# Patient Record
Sex: MALE | Race: ASIAN | NOT HISPANIC OR LATINO | Employment: OTHER
[De-identification: names, ages, dates, MRNs, and addresses within clinical notes are randomized per-mention and may not be internally consistent; named-entity substitution may affect disease eponyms.]

---

## 2024-02-13 ENCOUNTER — TRANSCRIBE ORDERS (OUTPATIENT)
Dept: OTHER | Age: 69
End: 2024-02-13

## 2024-02-13 DIAGNOSIS — K86.2 PANCREAS CYST: Primary | ICD-10-CM

## 2024-02-14 ENCOUNTER — PATIENT OUTREACH (OUTPATIENT)
Dept: SURGERY | Facility: CLINIC | Age: 69
End: 2024-02-14
Payer: COMMERCIAL

## 2024-02-14 NOTE — PROGRESS NOTES
New Patient Oncology Nurse Navigator Note     Referring provider: Dr. Colorado     Referring Clinic/Organization: [facility]      Referred to: Hepatobiliary Surgery      Requested provider (if applicable): Dr. Lito Rios    Referral Received: 02/14/24       Evaluation for : Pancreas Cyst      Clinical History (per Nurse review of records provided):      See book marked documents:     Referring MD office note  Pathology report  Imaging reports   Procedure report       Clinical Assessment / Barriers to Care (Per Nurse):    None at this time.     Records Location:     [facility]     Records Needed:     ABDOMINAL IMAGING (CT SCANS, MRI, US, PET SCANS)  DATING BACK TO 2017  EUS REPORT  PATHOLOGY REPORT      Additional testing needed prior to consult:     CT CAP     Referral updates and Plan:       Consult with Surgical Oncology     02/14/2024 9:55 AM - called patient with assistance of interpretor and left a voicemail for patient to return call to schedule consult with Dr. Rios. Provided the scheduling line in the voicemail and updated instructions for new patient scheduling to continue to contact or schedule should patient return call.     Lindsay Amezcua, RN, BSN   Surgical Oncology New Patient Nurse Navigator  [facility]  [phone]

## 2024-03-08 NOTE — TELEPHONE ENCOUNTER
"RECORDS STATUS - ALL OTHER DIAGNOSIS      RECORDS RECEIVED FROM:    DATE RECEIVED:    NOTES STATUS DETAILS   OFFICE NOTE from referring provider [facility] 01/10/24: Dr. Bryce Colorado   OFFICE NOTE from medical oncologist [facility] 02/07/24: Dr. Turang E. Behbahani   OFFICE NOTE from other specialist [facility] 10/06/23: Dr. Domingo Calderon   OPERATIVE REPORT [facility] 09/25/23: nya endoscopic ultrasound   MEDICATION LIST [facility]    LABS     PATHOLOGY REPORTS Report in [facility] Cytology:  09/25/23: [id number]  \"Non-diagnostic specimen, insufficient cellularity for definitive diagnosis\"   ANYTHING RELATED TO DIAGNOSIS [facility] Most recetn 03/08/24   IMAGING (NEED IMAGES & REPORT)     CT SCANS PACS [facility]:  07/24/23: CT CAP  10/25/17: CT AP   MRI PACS [facility]:  08/15/23: MR Abd   XRAYS PACS [facility]:  06/27/23: XR Abd  06/21/23: XR Chest     "

## 2024-03-13 ENCOUNTER — PREP FOR PROCEDURE (OUTPATIENT)
Dept: ONCOLOGY | Facility: CLINIC | Age: 69
End: 2024-03-13

## 2024-03-13 ENCOUNTER — ONCOLOGY VISIT (OUTPATIENT)
Dept: SURGERY | Facility: CLINIC | Age: 69
End: 2024-03-13
Attending: SURGERY
Payer: COMMERCIAL

## 2024-03-13 ENCOUNTER — PATIENT OUTREACH (OUTPATIENT)
Dept: ONCOLOGY | Facility: CLINIC | Age: 69
End: 2024-03-13

## 2024-03-13 ENCOUNTER — PRE VISIT (OUTPATIENT)
Dept: SURGERY | Facility: CLINIC | Age: 69
End: 2024-03-13
Payer: COMMERCIAL

## 2024-03-13 VITALS
TEMPERATURE: 97.9 F | DIASTOLIC BLOOD PRESSURE: 124 MMHG | WEIGHT: 128.6 LBS | RESPIRATION RATE: 16 BRPM | HEIGHT: 65 IN | SYSTOLIC BLOOD PRESSURE: 178 MMHG | BODY MASS INDEX: 21.43 KG/M2 | OXYGEN SATURATION: 97 % | HEART RATE: 86 BPM

## 2024-03-13 DIAGNOSIS — D49.0 IPMN (INTRADUCTAL PAPILLARY MUCINOUS NEOPLASM): Primary | ICD-10-CM

## 2024-03-13 DIAGNOSIS — C61 PROSTATE CANCER METASTATIC TO INTRAABDOMINAL LYMPH NODE (H): ICD-10-CM

## 2024-03-13 DIAGNOSIS — C77.2 PROSTATE CANCER METASTATIC TO INTRAABDOMINAL LYMPH NODE (H): ICD-10-CM

## 2024-03-13 DIAGNOSIS — K86.89 PANCREATIC MASS: Primary | ICD-10-CM

## 2024-03-13 DIAGNOSIS — K86.2 PANCREAS CYST: Primary | ICD-10-CM

## 2024-03-13 PROCEDURE — 99204 OFFICE O/P NEW MOD 45 MIN: CPT | Performed by: SURGERY

## 2024-03-13 RX ORDER — ALBUTEROL SULFATE 0.83 MG/ML
2.5 SOLUTION RESPIRATORY (INHALATION)
Status: CANCELLED | OUTPATIENT
Start: 2024-03-20

## 2024-03-13 RX ORDER — DIPHENHYDRAMINE HYDROCHLORIDE 50 MG/ML
50 INJECTION INTRAMUSCULAR; INTRAVENOUS
Status: CANCELLED
Start: 2024-03-20

## 2024-03-13 RX ORDER — ALBUTEROL SULFATE 90 UG/1
1-2 AEROSOL, METERED RESPIRATORY (INHALATION)
Status: CANCELLED
Start: 2024-03-20

## 2024-03-13 RX ORDER — MEPERIDINE HYDROCHLORIDE 25 MG/ML
25 INJECTION INTRAMUSCULAR; INTRAVENOUS; SUBCUTANEOUS EVERY 30 MIN PRN
Status: CANCELLED | OUTPATIENT
Start: 2024-03-20

## 2024-03-13 RX ORDER — METHYLPREDNISOLONE SODIUM SUCCINATE 125 MG/2ML
125 INJECTION, POWDER, LYOPHILIZED, FOR SOLUTION INTRAMUSCULAR; INTRAVENOUS
Status: CANCELLED
Start: 2024-03-20

## 2024-03-13 RX ORDER — ALLOPURINOL 100 MG/1
2 TABLET ORAL EVERY MORNING
COMMUNITY
Start: 2023-05-03

## 2024-03-13 RX ORDER — EPINEPHRINE 1 MG/ML
0.3 INJECTION, SOLUTION INTRAMUSCULAR; SUBCUTANEOUS EVERY 5 MIN PRN
Status: CANCELLED | OUTPATIENT
Start: 2024-03-20

## 2024-03-13 RX ORDER — OMEPRAZOLE 20 MG/1
20 TABLET, DELAYED RELEASE ORAL
COMMUNITY

## 2024-03-13 RX ORDER — IBUPROFEN 200 MG
200 TABLET ORAL EVERY 4 HOURS PRN
COMMUNITY

## 2024-03-13 ASSESSMENT — PAIN SCALES - GENERAL: PAINLEVEL: SEVERE PAIN (7)

## 2024-03-13 NOTE — PROGRESS NOTES
Mr. Conteh is a 68-year-old Cantonese gentleman who presents with his son today.  He has a recent history of metastatic prostate cancer with significant periaortic lymphadenopathy extending throughout his abdomen.  He is also been found to have a large pancreatic cyst measuring approximately 6 cm.  It is abutting an area of massive lymphadenopathy from his prostate cancer.  He recently began treatment for the prostate cancer and his PSA has now normalized.  His most recent imaging is from August of last year.    The pancreatic cyst has been assessed with endoscopic ultrasound.  The fluid analysis was consistent with IPMN in that it had a elevated amylase, elevated CEA, and a glucose of 10.  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    The patient has left upper quadrant pain which is constant and has been persistent for many months now.    On physical examination his abdomen is scaphoid, it is soft, it is benign.  Palpation does not elicit any pain.  I cannot easily identify significant adenopathy seen on his previous scans, and I wonder if it is improved with treatment.    The patient's prostate cancer team feels that with medical management he could have a a life expectancy of 10 years or more.  With this in mind I was asked to see him for consideration of distal pancreatectomy for treatment of his pancreatic cyst.    I had a long discussion with the patient and his son, who declined an  today, regarding the findings on his scan and the implications of a mucinous cyst of the pancreas.  I discussed the risk of cancer developing and I also discussed the small chance that there is already cancer present, most  particularly because of the fact that his splenic vein is already occluded.  I discussed that I do think it is reasonable to proceed with a distal pancreatectomy and splenectomy.  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  I discussed risks of surgery including but not limited to bleeding, infection, pancreatic leak requiring abdominal drainage or additional procedures, postsplenectomy sepsis, permanent immunosuppression, diabetes, pancreatic insufficiency, and a risk of death from surgery.    We will get splenectomy vaccines ordered.  We will also repeat CT chest abdomen pelvis with pancreas protocol given that his most recent imaging was quite a while ago.  I will have a phone visit with him after that imaging just to be sure that nothing has changed in a significant way that would change our plan.    Beyond that, we will get him scheduled for a planned open distal pancreatectomy and splenectomy in the coming month or so.    30 minutes was spent face-to-face time.  15 minutes was spent in review of history, imaging, coordination of care.  5 minutes was spent in documentation.    The above was transcribed using Dragon voice recognition software that is now required for use by [facility] and [facility] in place of transcription of dictated notes.  This change may unfortunately lead into an increase in errors in the EMR. While I reviewed and edited the transcription, I may miss errors.  Please let me know of any of serious errors and I will addend the note.

## 2024-03-13 NOTE — PROGRESS NOTES
[facility]: Cancer Care Initial Note                                    Discussion with Patient:                                                      Intro to RNCC role     Education concerns: pre & post op teach        Assessment:                                                      Initial  Current living arrangement:: I live in a private home with spouse  Type of residence:: Private home - stairs  Informal Support system:: Children;Spouse  Equipment Currently Used at Home: none  Bed or wheelchair confined:: No  Mobility Status: Independent  Transportation means:: Regular car  Medication adherence problem (GOAL):: No  Knowledgeable about how to use meds:: Yes  Medication side effects suspected:: No  Advanced Care Plans/Directives on file:: No  Patient Reported Pain?: Yes, is currently well-managed    Plan of Care Education Review:   Assessment completed with:: Patient    Plan of Care Education   Yearly learning assessment completed?: Yes (see Education tab)  Diagnosis:: pancreas cyst  Tx plan/regimen:: open distal pancreatectomy and splenectomy  Does patient understand treatment plan/regimen?: Yes  Preparing for treatment:: Reviewed treatment preparation information with patient (vascular access, day of chemo, visitor policy, what to bring, etc.)  Vascular access education provided for:: Peripheral IV  Safety/self care at home reviewed with patient:: Yes  Coping - concerns/fears reviewed with patient:: Yes  Plan of Care:: Imaging;MD follow-up appointment  Reasons for deferring treatment reviewed with patient:: Yes    Evaluation of Learning  Patient Education Provided: Yes  Readiness:: Acceptance  Method:: Booklet/Handout  Response:: Demonstrates understanding           Intervention/Education provided during outreach:                                                       Discussed expectations before and after surgery   Will need new scan and visit in the upcoming weeks  Splenectomy vaccines    Patient to  follow up as scheduled at next appt  Patient to call/[employer] message with updates  Confirmed patient has clinic and triage numbers    Carolyn Thorpe, RN, BSN  Specialty Care Coordinator  [facility]  [phone]

## 2024-03-13 NOTE — LETTER
"    3/13/2024         RE: Basil Conteh  [address]  [place] [address]        Dear Colleague,    Thank you for referring your patient, Basil Conteh, to the [facility]. Please see a copy of my visit note below.    Oncology Rooming Note    March 13, 2024 9:57 AM   Basil Conteh is a 68 year old male who presents for:    Chief Complaint   Patient presents with     Oncology Clinic Visit     Initial Vitals: BP (!) 178/124   Pulse 86   Temp 97.9  F (36.6  C) (Oral)   Resp 16   Ht 1.651 m (5' 5\")   Wt 58.3 kg (128 lb 9.6 oz)   SpO2 97%   BMI 21.40 kg/m   Estimated body mass index is 21.4 kg/m  as calculated from the following:    Height as of this encounter: 1.651 m (5' 5\").    Weight as of this encounter: 58.3 kg (128 lb 9.6 oz). Body surface area is 1.64 meters squared.  Severe Pain (7) Comment: Data Unavailable   No LMP for male patient.  Allergies reviewed: Yes  Medications reviewed: Yes    Medications: Medication refills not needed today.  Pharmacy name entered into [employer]: [id number] - [place], [place] [address] AT [facility] [address]    Frailty Screening:   Is the patient here for a new oncology consult visit in cancer care? 2. No      Clinical concerns:   doctor was notified.      Sol Ballesteros, [facility]              Mr. Conteh is a 68-year-old Cantonese gentleman who presents with his son today.  He has a recent history of metastatic prostate cancer with significant periaortic lymphadenopathy extending throughout his abdomen.  He is also been found to have a large pancreatic cyst measuring approximately 6 cm.  It is abutting an area of massive lymphadenopathy from his prostate cancer.  He recently began treatment for the prostate cancer and his PSA has now normalized.  His most recent imaging is from August of last year.    The pancreatic cyst has been assessed with endoscopic ultrasound.  The fluid analysis was consistent with IPMN in that it " had a elevated amylase, elevated CEA, and a glucose of 10.  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    The patient has left upper quadrant pain which is constant and has been persistent for many months now.    On physical examination his abdomen is scaphoid, it is soft, it is benign.  Palpation does not elicit any pain.  I cannot easily identify significant adenopathy seen on his previous scans, and I wonder if it is improved with treatment.    The patient's prostate cancer team feels that with medical management he could have a a life expectancy of 10 years or more.  With this in mind I was asked to see him for consideration of distal pancreatectomy for treatment of his pancreatic cyst.    I had a long discussion with the patient and his son, who declined an  today, regarding the findings on his scan and the implications of a mucinous cyst of the pancreas.  I discussed the risk of cancer developing and I also discussed the small chance that there is already cancer present, most particularly because of the fact that his splenic vein is already occluded.  I discussed that I do think it is reasonable to proceed with a distal pancreatectomy and splenectomy.  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  I discussed risks of surgery including but not limited to bleeding, infection, pancreatic leak requiring abdominal drainage or additional procedures, postsplenectomy sepsis, permanent immunosuppression, diabetes, pancreatic insufficiency, and a risk of death from surgery.    We will get  splenectomy vaccines ordered.  We will also repeat CT chest abdomen pelvis with pancreas protocol given that his most recent imaging was quite a while ago.  I will have a phone visit with him after that imaging just to be sure that nothing has changed in a significant way that would change our plan.    Beyond that, we will get him scheduled for a planned open distal pancreatectomy and splenectomy in the coming month or so.    30 minutes was spent face-to-face time.  15 minutes was spent in review of history, imaging, coordination of care.  5 minutes was spent in documentation.    The above was transcribed using Dragon voice recognition software that is now required for use by [facility] and [facility] in place of transcription of dictated notes.  This change may unfortunately lead into an increase in errors in the EMR. While I reviewed and edited the transcription, I may miss errors.  Please let me know of any of serious errors and I will addend the note.       Again, thank you for allowing me to participate in the care of your patient.        Sincerely,        Lito Rios MD

## 2024-03-13 NOTE — PROGRESS NOTES
"Oncology Rooming Note    March 13, 2024 9:57 AM   Basil Conteh is a 68 year old male who presents for:    Chief Complaint   Patient presents with    Oncology Clinic Visit     Initial Vitals: BP (!) 178/124   Pulse 86   Temp 97.9  F (36.6  C) (Oral)   Resp 16   Ht 1.651 m (5' 5\")   Wt 58.3 kg (128 lb 9.6 oz)   SpO2 97%   BMI 21.40 kg/m   Estimated body mass index is 21.4 kg/m  as calculated from the following:    Height as of this encounter: 1.651 m (5' 5\").    Weight as of this encounter: 58.3 kg (128 lb 9.6 oz). Body surface area is 1.64 meters squared.  Severe Pain (7) Comment: Data Unavailable   No LMP for male patient.  Allergies reviewed: Yes  Medications reviewed: Yes    Medications: Medication refills not needed today.  Pharmacy name entered into ????: [id number] [place] [address] [address] [address]    Frailty Screening:   Is the patient here for a new oncology consult visit in cancer care? 2. No      Clinical concerns:   doctor was notified.      Sol Ballesteros CMA            "

## 2024-03-14 ENCOUNTER — TELEPHONE (OUTPATIENT)
Dept: SURGERY | Facility: CLINIC | Age: 69
End: 2024-03-14
Payer: COMMERCIAL

## 2024-03-14 NOTE — TELEPHONE ENCOUNTER
Called and LVM with [facility]  for patient to schedule surgery with Dr. Rios. Provided direct call back number [phone].      Adriane Yuan on 3/14/2024 at 12:07 PM

## 2024-03-18 ENCOUNTER — HOSPITAL ENCOUNTER (INPATIENT)
Facility: CLINIC | Age: 69
Setting detail: SURGERY ADMIT
End: 2024-03-18
Attending: SURGERY | Admitting: SURGERY
Payer: COMMERCIAL

## 2024-03-18 NOTE — TELEPHONE ENCOUNTER
Called patient with [employer]  and spoke with patient. Scheduled patient for surgery on 4/25 at [place].    CT scan and H&P scheduled for 4/11. Patient is aware they will get their arrival time for surgery at PAC appointment.    Pre surgery appointment with Dr. Rios scheduled for 4/17 at [facility].    Post-op scheduled for 5/8 with Dr. Rios in [facility].    Writer will mail surgery packet.    No further questions/concerns at this time.    Adriane Yuan on 3/18/2024 at 9:25 AM

## 2024-03-19 NOTE — TELEPHONE ENCOUNTER
FUTURE VISIT INFORMATION      SURGERY INFORMATION:  Date: 24  Location: uu or  Surgeon:  Lito Rios MD   Anesthesia Type:  general  Procedure: OPEN DISTAL PANCREATECTOMY, WITH SPLENECTOMY   Consult: ov 3/13/24    RECORDS REQUESTED FROM:       Primary Care Provider: Sandra Ayala MD  - [facility]    Most recent EKG+ Tracin23- [facility]

## 2024-03-20 ENCOUNTER — ALLIED HEALTH/NURSE VISIT (OUTPATIENT)
Dept: INFUSION THERAPY | Facility: CLINIC | Age: 69
End: 2024-03-20
Attending: PSYCHIATRY & NEUROLOGY
Payer: COMMERCIAL

## 2024-03-20 VITALS
SYSTOLIC BLOOD PRESSURE: 167 MMHG | TEMPERATURE: 97.5 F | HEART RATE: 84 BPM | RESPIRATION RATE: 16 BRPM | DIASTOLIC BLOOD PRESSURE: 116 MMHG

## 2024-03-20 DIAGNOSIS — D49.0 IPMN (INTRADUCTAL PAPILLARY MUCINOUS NEOPLASM): Primary | ICD-10-CM

## 2024-03-20 PROCEDURE — G0009 ADMIN PNEUMOCOCCAL VACCINE: HCPCS | Performed by: SURGERY

## 2024-03-20 PROCEDURE — 90734 MENACWYD/MENACWYCRM VACC IM: CPT | Performed by: SURGERY

## 2024-03-20 PROCEDURE — 250N000011 HC RX IP 250 OP 636: Performed by: SURGERY

## 2024-03-20 PROCEDURE — 90648 HIB PRP-T VACCINE 4 DOSE IM: CPT | Performed by: SURGERY

## 2024-03-20 PROCEDURE — 90620 MENB-4C VACCINE IM: CPT | Performed by: SURGERY

## 2024-03-20 PROCEDURE — 90670 PCV13 VACCINE IM: CPT | Performed by: SURGERY

## 2024-03-20 PROCEDURE — 250N000021 HC RX MED A9270 GY (STAT IND- M) 250: Performed by: SURGERY

## 2024-03-20 PROCEDURE — 90472 IMMUNIZATION ADMIN EACH ADD: CPT | Performed by: SURGERY

## 2024-03-20 RX ORDER — ALBUTEROL SULFATE 0.83 MG/ML
2.5 SOLUTION RESPIRATORY (INHALATION)
Status: CANCELLED | OUTPATIENT
Start: 2024-04-17

## 2024-03-20 RX ORDER — METHYLPREDNISOLONE SODIUM SUCCINATE 125 MG/2ML
125 INJECTION, POWDER, LYOPHILIZED, FOR SOLUTION INTRAMUSCULAR; INTRAVENOUS
Status: CANCELLED
Start: 2024-04-17

## 2024-03-20 RX ORDER — MEPERIDINE HYDROCHLORIDE 25 MG/ML
25 INJECTION INTRAMUSCULAR; INTRAVENOUS; SUBCUTANEOUS EVERY 30 MIN PRN
Status: CANCELLED | OUTPATIENT
Start: 2024-04-17

## 2024-03-20 RX ORDER — EPINEPHRINE 1 MG/ML
0.3 INJECTION, SOLUTION INTRAMUSCULAR; SUBCUTANEOUS EVERY 5 MIN PRN
Status: CANCELLED | OUTPATIENT
Start: 2024-04-17

## 2024-03-20 RX ORDER — ALBUTEROL SULFATE 90 UG/1
1-2 AEROSOL, METERED RESPIRATORY (INHALATION)
Status: CANCELLED
Start: 2024-04-17

## 2024-03-20 RX ORDER — DIPHENHYDRAMINE HYDROCHLORIDE 50 MG/ML
50 INJECTION INTRAMUSCULAR; INTRAVENOUS
Status: CANCELLED
Start: 2024-04-17

## 2024-03-20 RX ADMIN — MENINGOCOCCAL (GROUPS A, C, Y AND W-135) OLIGOSACCHARIDE DIPHTHERIA CRM197 CONJUGATE VACCINE 0.5 ML: KIT at 13:15

## 2024-03-20 RX ADMIN — PNEUMOCOCCAL 13-VALENT CONJUGATE VACCINE 0.5 ML: 2.2; 2.2; 2.2; 2.2; 2.2; 4.4; 2.2; 2.2; 2.2; 2.2; 2.2; 2.2; 2.2 INJECTION, SUSPENSION INTRAMUSCULAR at 12:59

## 2024-03-20 RX ADMIN — HAEMOPHILUS B POLYSACCHARIDE CONJUGATE VACCINE FOR INJ 0.5 ML: RECON SOLN at 13:14

## 2024-03-20 RX ADMIN — NEISSERIA MENINGITIDIS SEROGROUP B NHBA FUSION PROTEIN ANTIGEN, NEISSERIA MENINGITIDIS SEROGROUP B FHBP FUSION PROTEIN ANTIGEN AND NEISSERIA MENINGITIDIS SEROGROUP B NADA PROTEIN ANTIGEN 0.5 ML: 50; 50; 50; 25 INJECTION, SUSPENSION INTRAMUSCULAR at 13:13

## 2024-03-20 NOTE — PROGRESS NOTES
Infusion Nursing Note:  Basil Conteh presents today for Asplenia Vaccines.    Patient seen by provider today: No   present during visit today: Patient speaks Cantonese; Patient's son helps interpret.     Note: N/A.      Intravenous Access:  No Intravenous access/labs at this visit.    Treatment Conditions:  Not Applicable.      Post Infusion Assessment:  Patient tolerated injection without incident.       Discharge Plan:   Discharge instructions reviewed with: Patient and Family.  Patient and/or family verbalized understanding of discharge instructions and all questions answered.  Copy of AVS reviewed with patient and/or family.  Patient will return 4/17/24 for next appointment.  Patient discharged in stable condition accompanied by: son.  Departure Mode: Ambulatory.      Anmol Singh RN

## 2024-04-10 LAB
ABO/RH(D): NORMAL
ANTIBODY SCREEN: NEGATIVE
SPECIMEN EXPIRATION DATE: NORMAL

## 2024-04-11 ENCOUNTER — PRE VISIT (OUTPATIENT)
Dept: SURGERY | Facility: CLINIC | Age: 69
End: 2024-04-11

## 2024-04-11 ENCOUNTER — ANESTHESIA EVENT (OUTPATIENT)
Dept: SURGERY | Facility: CLINIC | Age: 69
End: 2024-04-11

## 2024-04-11 ENCOUNTER — LAB (OUTPATIENT)
Dept: LAB | Facility: CLINIC | Age: 69
End: 2024-04-11
Payer: COMMERCIAL

## 2024-04-11 ENCOUNTER — ANCILLARY PROCEDURE (OUTPATIENT)
Dept: CARDIOLOGY | Facility: CLINIC | Age: 69
End: 2024-04-11
Attending: NURSE PRACTITIONER
Payer: COMMERCIAL

## 2024-04-11 ENCOUNTER — ANCILLARY PROCEDURE (OUTPATIENT)
Dept: CT IMAGING | Facility: CLINIC | Age: 69
End: 2024-04-11
Attending: SURGERY
Payer: COMMERCIAL

## 2024-04-11 ENCOUNTER — TELEPHONE (OUTPATIENT)
Dept: SURGERY | Facility: CLINIC | Age: 69
End: 2024-04-11

## 2024-04-11 ENCOUNTER — OFFICE VISIT (OUTPATIENT)
Dept: SURGERY | Facility: CLINIC | Age: 69
End: 2024-04-11
Payer: COMMERCIAL

## 2024-04-11 VITALS
BODY MASS INDEX: 21.29 KG/M2 | RESPIRATION RATE: 16 BRPM | HEIGHT: 65 IN | HEART RATE: 96 BPM | TEMPERATURE: 97.5 F | SYSTOLIC BLOOD PRESSURE: 157 MMHG | DIASTOLIC BLOOD PRESSURE: 107 MMHG | WEIGHT: 127.8 LBS | OXYGEN SATURATION: 99 %

## 2024-04-11 DIAGNOSIS — C77.2 PROSTATE CANCER METASTATIC TO INTRAABDOMINAL LYMPH NODE (H): ICD-10-CM

## 2024-04-11 DIAGNOSIS — Z01.818 PREOP EXAMINATION: Primary | ICD-10-CM

## 2024-04-11 DIAGNOSIS — I10 HYPERTENSION, UNSPECIFIED TYPE: ICD-10-CM

## 2024-04-11 DIAGNOSIS — Z01.818 PREOP EXAMINATION: ICD-10-CM

## 2024-04-11 DIAGNOSIS — R94.39 ABNORMAL STRESS ECHOCARDIOGRAM: ICD-10-CM

## 2024-04-11 DIAGNOSIS — D49.0 IPMN (INTRADUCTAL PAPILLARY MUCINOUS NEOPLASM): ICD-10-CM

## 2024-04-11 DIAGNOSIS — C61 PROSTATE CANCER METASTATIC TO INTRAABDOMINAL LYMPH NODE (H): ICD-10-CM

## 2024-04-11 DIAGNOSIS — K86.2 PANCREAS CYST: ICD-10-CM

## 2024-04-11 LAB
ALBUMIN SERPL BCG-MCNC: 4.2 G/DL (ref 3.5–5.2)
ALP SERPL-CCNC: 100 U/L (ref 40–150)
ALT SERPL W P-5'-P-CCNC: <5 U/L (ref 0–70)
ANION GAP SERPL CALCULATED.3IONS-SCNC: 11 MMOL/L (ref 7–15)
AST SERPL W P-5'-P-CCNC: 16 U/L (ref 0–45)
BILIRUB SERPL-MCNC: 0.3 MG/DL
BUN SERPL-MCNC: 28.3 MG/DL (ref 8–23)
CALCIUM SERPL-MCNC: 9.4 MG/DL (ref 8.8–10.2)
CHLORIDE SERPL-SCNC: 101 MMOL/L (ref 98–107)
CREAT SERPL-MCNC: 0.99 MG/DL (ref 0.67–1.17)
DEPRECATED HCO3 PLAS-SCNC: 26 MMOL/L (ref 22–29)
EGFRCR SERPLBLD CKD-EPI 2021: 83 ML/MIN/1.73M2
ERYTHROCYTE [DISTWIDTH] IN BLOOD BY AUTOMATED COUNT: 13.2 % (ref 10–15)
GLUCOSE SERPL-MCNC: 106 MG/DL (ref 70–99)
HCT VFR BLD AUTO: 39.2 % (ref 40–53)
HGB BLD-MCNC: 13.2 G/DL (ref 13.3–17.7)
LVEF ECHO: NORMAL
MCH RBC QN AUTO: 29 PG (ref 26.5–33)
MCHC RBC AUTO-ENTMCNC: 33.7 G/DL (ref 31.5–36.5)
MCV RBC AUTO: 86 FL (ref 78–100)
PLATELET # BLD AUTO: 219 10E3/UL (ref 150–450)
POTASSIUM SERPL-SCNC: 4.7 MMOL/L (ref 3.4–5.3)
PROT SERPL-MCNC: 7.3 G/DL (ref 6.4–8.3)
RBC # BLD AUTO: 4.55 10E6/UL (ref 4.4–5.9)
SODIUM SERPL-SCNC: 138 MMOL/L (ref 135–145)
WBC # BLD AUTO: 7.1 10E3/UL (ref 4–11)

## 2024-04-11 PROCEDURE — 80053 COMPREHEN METABOLIC PANEL: CPT | Performed by: PATHOLOGY

## 2024-04-11 PROCEDURE — 74177 CT ABD & PELVIS W/CONTRAST: CPT | Performed by: RADIOLOGY

## 2024-04-11 PROCEDURE — 85027 COMPLETE CBC AUTOMATED: CPT | Performed by: PATHOLOGY

## 2024-04-11 PROCEDURE — 93306 TTE W/DOPPLER COMPLETE: CPT | Performed by: INTERNAL MEDICINE

## 2024-04-11 PROCEDURE — 36415 COLL VENOUS BLD VENIPUNCTURE: CPT | Performed by: PATHOLOGY

## 2024-04-11 PROCEDURE — 99203 OFFICE O/P NEW LOW 30 MIN: CPT | Performed by: NURSE PRACTITIONER

## 2024-04-11 PROCEDURE — 86900 BLOOD TYPING SEROLOGIC ABO: CPT | Performed by: NURSE PRACTITIONER

## 2024-04-11 RX ORDER — ACETAMINOPHEN 500 MG
500-1000 TABLET ORAL EVERY MORNING
COMMUNITY

## 2024-04-11 RX ORDER — AMLODIPINE BESYLATE 2.5 MG/1
2.5 TABLET ORAL DAILY
Qty: 1 TABLET | Refills: 1 | Status: SHIPPED | OUTPATIENT
Start: 2024-04-11 | End: 2024-04-16

## 2024-04-11 RX ORDER — IOPAMIDOL 755 MG/ML
74 INJECTION, SOLUTION INTRAVASCULAR ONCE
Status: COMPLETED | OUTPATIENT
Start: 2024-04-11 | End: 2024-04-11

## 2024-04-11 RX ORDER — DIPHENHYDRAMINE HCL 25 MG
25 TABLET ORAL EVERY MORNING
COMMUNITY

## 2024-04-11 RX ADMIN — IOPAMIDOL 74 ML: 755 INJECTION, SOLUTION INTRAVASCULAR at 12:56

## 2024-04-11 ASSESSMENT — PAIN SCALES - GENERAL: PAINLEVEL: SEVERE PAIN (7)

## 2024-04-11 NOTE — H&P
Pre-Operative H & P     CC:  Preoperative exam to assess for increased cardiopulmonary risk while undergoing surgery and anesthesia.    Date of Encounter: 4/11/2024  Primary Care Physician:  No Ref-Primary, Physician     Reason for visit:   Encounter Diagnoses   Name Primary?    Preop examination Yes    Prostate cancer metastatic to intraabdominal lymph node (H)     IPMN (intraductal papillary mucinous neoplasm)     Hypertension, unspecified type        HPI  Basil Conteh is a 68 year old male who presents for pre-operative H & P in preparation for  Procedure Information       Case: [id number] Date/Time: 04/25/24 0800    Procedure: OPEN DISTAL PANCREATECTOMY, WITH SPLENECTOMY (Abdomen)    Anesthesia type: General    Diagnosis: Pancreas cyst [K86.2]    Pre-op diagnosis: Pancreas cyst [K86.2]    [facility]: [facility]  [facility]    Providers: Lito Rios MD            Basil Conteh is a 68 year old male with hypertension, gout, GERD and metastatic prostate cancer that has an IPMN and a splenic vein occlusion.  He has been following at an outside facility for prostate cancer with significant periaortic lymphadenopathy.  He is currently managed on enzalutamide.  He as found to have a large pancreatic cyst that is abutting an area of massive lymphadenopathy from the prostate cancer.   The pancreatic cyst has been assessed with endoscopic ultrasound.  The fluid analysis was consistent with IPMN.  He has consulted with Dr. Rios and the above listed procedure has been recommended for treatment.           History is obtained from the patient, his son and chart review.    The patient's son interpreted throughout the visit - waiver signed.     Hx of abnormal bleeding or anti-platelet use: none      Past Medical History  Past Medical History:   Diagnosis Date    GERD (gastroesophageal reflux disease)     Gout     IPMN (intraductal papillary mucinous neoplasm)     Prostate cancer metastatic to multiple sites (H)        Past  Surgical History  Past Surgical History:   Procedure Laterality Date    HERNIA REPAIR         Prior to Admission Medications  Current Outpatient Medications   Medication Sig Dispense Refill    acetaminophen (TYLENOL) 500 MG tablet Take 500-1,000 mg by mouth every morning      allopurinol (ZYLOPRIM) 100 MG tablet Take 2 tablets by mouth every morning      amLODIPine (NORVASC) 2.5 MG tablet Take 1 tablet (2.5 mg) by mouth daily 1 tablet 1    diphenhydrAMINE (BENADRYL) 25 MG tablet Take 25 mg by mouth every morning      enzalutamide (XTANDI) 40 MG capsule Take 4 capsules by mouth daily (with lunch)      omeprazole (PRILOSEC OTC) 20 MG EC tablet Take 20 mg by mouth daily (with breakfast)      ibuprofen (ADVIL/MOTRIN) 200 MG tablet Take 200 mg by mouth every 4 hours as needed         Allergies  No Known Allergies    Social History  Social History     Socioeconomic History    Marital status:      Spouse name: Not on file    Number of children: 1    Years of education: Not on file    Highest education level: Not on file   Occupational History    Occupation: retired   Tobacco Use    Smoking status: Never    Smokeless tobacco: Never   Substance and Sexual Activity    Alcohol use: Never    Drug use: Never    Sexual activity: Not on file   Other Topics Concern    Not on file   Social History Narrative    Not on file     Social Determinants of Health     Financial Resource Strain: Not on file   Food Insecurity: Not on file   Transportation Needs: Not on file   Physical Activity: Not on file   Stress: Not on file   Social Connections: Not on file   Interpersonal Safety: Not on file   Housing Stability: Not on file       Family History  Family History   Problem Relation Age of Onset    Anesthesia Reaction No family hx of     Thrombosis No family hx of        Review of Systems  The complete review of systems is negative other than noted in the HPI or here.   Anesthesia Evaluation   Pt has had prior anesthetic.     No  "history of anesthetic complications       ROS/MED HX  ENT/Pulmonary:     (+)     ELEAZAR risk factors,  hypertension,                                 Neurologic:  - neg neurologic ROS     Cardiovascular:     (+)  hypertension- -   -  - -                                 Previous cardiac testing   Echo: Date: 4/11/24 Results:  Interpretation Summary  Global and regional left ventricular function is normal with an EF of 60-65%.  Right ventricular function, chamber size, wall motion, and thickness are  normal.  The inferior vena cava is normal.  No pericardial effusion is present.  No significant valvular abnormalities present.    Stress Test:  Date: 2013 Results:  CONCLUSION:   Normal bicycle stress echocardiogram with adequate heart rate and   workload.   No evidence for inducible ischemia.   LVH is present. Question of apical hypertrophic cardiomyopathy.     ECG Reviewed:  Date: 6/2023 Results:  Sinus rhythm   Minimal voltage criteria for LVH, may be normal variant   ST & T wave abnormality, consider anterolateral ischemia   Abnormal ECG     Cath:  Date: Results:      METS/Exercise Tolerance: >4 METS Comment: Walks his dogs 30 minutes daily.  Also does yard work.  Denies any exertional dyspnea or angina.    Hematologic:  - neg hematologic  ROS     Musculoskeletal: Comment: Intermittent back pain      GI/Hepatic: Comment: Splenic vein occlusion    (+) GERD, Symptomatic,                  Renal/Genitourinary: Comment: Prostate cancer      Endo: Comment: IPMN      Psychiatric/Substance Use:  - neg psychiatric ROS     Infectious Disease:  - neg infectious disease ROS     Malignancy:   (+) Malignancy, History of Prostate.Prostate CA Active status post.      Other:  - neg other ROS          BP (!) 157/107 (BP Location: Left arm, Patient Position: Sitting, Cuff Size: Adult Regular)   Pulse 96   Temp 97.5  F (36.4  C) (Oral)   Resp 16   Ht 1.651 m (5' 5\")   Wt 58 kg (127 lb 12.8 oz)   SpO2 99%   BMI 21.27 kg/m  "     Physical Exam   Constitutional: Awake, alert, cooperative, no apparent distress, and appears stated age.  Eyes: Pupils equal, round and reactive to light, extra ocular muscles intact, sclera clear, conjunctiva normal.  HENT: Normocephalic, oral pharynx with moist mucus membranes, good dentition. No goiter appreciated.   Respiratory: Clear to auscultation bilaterally, no crackles or wheezing.  Cardiovascular: Regular rate and rhythm, normal S1 and S2, and no murmur noted.  Carotids +2, no bruits. No edema. Palpable pulses to radial  DP and PT arteries.   GI: Normal bowel sounds, soft, non-distended, non-tender, no masses palpated, no hepatosplenomegaly.    Lymph/Hematologic: No cervical lymphadenopathy and no supraclavicular lymphadenopathy.  Genitourinary:  deferred  Skin: Warm and dry.  No rashes at anticipated surgical site.   Musculoskeletal: Full ROM of neck. There is no redness, warmth, or swelling of the exposed joints. Gross motor strength is normal.    Neurologic: Awake, alert, oriented to name, place and time. Cranial nerves II-XII are grossly intact. Gait is normal.   Neuropsychiatric: Calm, cooperative. Normal affect.     Prior Labs/Diagnostic Studies   All labs and imaging personally reviewed     EKG/ stress test - if available please see in ROS above           The patient's records and results personally reviewed by this provider.     Outside records reviewed from: [facility]    LAB/DIAGNOSTIC STUDIES TODAY:    Component      Latest Ref Rng 4/11/2024  2:18 PM   Sodium      135 - 145 mmol/L 138    Potassium      3.4 - 5.3 mmol/L 4.7    Carbon Dioxide (CO2)      22 - 29 mmol/L 26    Anion Gap      7 - 15 mmol/L 11    Urea Nitrogen      8.0 - 23.0 mg/dL 28.3 (H)    Creatinine      0.67 - 1.17 mg/dL 0.99    GFR Estimate      >60 mL/min/1.73m2 83    Calcium      8.8 - 10.2 mg/dL 9.4    Chloride      98 - 107 mmol/L 101    Glucose      70 - 99 mg/dL 106 (H)    Alkaline Phosphatase      40 - 150 U/L  100    AST      0 - 45 U/L 16    ALT      0 - 70 U/L <5    Protein Total      6.4 - 8.3 g/dL 7.3    Albumin      3.5 - 5.2 g/dL 4.2    Bilirubin Total      <=1.2 mg/dL 0.3    WBC      4.0 - 11.0 10e3/uL 7.1    RBC Count      4.40 - 5.90 10e6/uL 4.55    Hemoglobin      13.3 - 17.7 g/dL 13.2 (L)    Hematocrit      40.0 - 53.0 % 39.2 (L)    MCV      78 - 100 fL 86    MCH      26.5 - 33.0 pg 29.0    MCHC      31.5 - 36.5 g/dL 33.7    RDW      10.0 - 15.0 % 13.2    Platelet Count      150 - 450 10e3/uL 219       Legend:  (H) High  (L) Low    Assessment    Basil Conteh is a 68 year old male seen as a PAC referral for risk assessment and optimization for anesthesia.    Plan/Recommendations  Pt will be optimized for the proposed procedure.  See below for details on the assessment, risk, and preoperative recommendations    NEUROLOGY  - No history of TIA, CVA or seizure    -Post Op delirium risk factors:  No risk identified    ENT  - No current airway concerns.  Will need to be reassessed day of surgery.  Mallampati: I  TM: > 3    CARDIAC  - No history of CAD and Afib  - prior to today's PAC visit, he hasn't been diagnosed with hypertension, but blood pressure x 2 elevated in clinic today with DBP >100.  Looking back in oncology notes from [facility] Everywhere he has had similar BPs dating back to February.  He reports that overall his blood pressures have been elevated for at least a year.  He doesn't check his blood pressures at home.  Patient doesn't think he will be able to get into his primary care office within the next week, but is willing to make an appt as soon as they have availability.  Will start amlodipine 2.5mg daily with a 30 day supply (1 refill).  Patient encouraged to purchase a blood pressure monitor for home and check blood pressures daily.  Will ask [facility] to call patient late next week to check up on home readings.  Patient to follow up with primary care provider as soon as possible for continued  "hypertension management.  Patient and son verbalized their understanding and are agreeable with the plan.    - review of stress echo from 2013 at outside facility which said question apical hypertrophic cardiomyopathy.  No follow up found.  Patient asymptomatic, but will order transthoracic echo for re-evaluation prior to surgery.     **addendum (4/12/24)- echo completed yesterday afternoon.  No noted concern for apical hypertrophic cardiomyopathy.  No other concerning findings.  See report in ROS.**    - METS (Metabolic Equivalents)  Patient performs 4 or more METS exercise without symptoms             Total Score: 0      RCRI-Low risk: Class 2 0.9% complication rate             Total Score: 1    RCRI: High Risk Surgery        PULMONARY    ELEAZAR Medium Risk             Total Score: 3    ELEAZAR: Hypertension    ELEAZAR: Over 50 ys old    ELEAZAR: Male      - Denies asthma or inhaler use  - Tobacco History    History   Smoking Status    Never   Smokeless Tobacco    Never       GI  - GERD isn't well controlled despite omeprazole.  Will order Bicitra for DOS.  PONV Medium Risk  Total Score: 2           1 AN PONV: Patient is not a current smoker    1 AN PONV: Intended Post Op Opioids          - on enzalutamide for prostate cancer.  Denies any LUTS.    ENDOCRINE    - BMI: Estimated body mass index is 21.27 kg/m  as calculated from the following:    Height as of this encounter: 1.651 m (5' 5\").    Weight as of this encounter: 58 kg (127 lb 12.8 oz).  Healthy Weight (BMI 18.5-24.9)  - No history of Diabetes Mellitus    - IPMN.  Surgery planned as above.      HEME  VTE High Risk 3%             Total Score: 8    VTE: Greater than 59 yrs old    VTE: Male    VTE: Current cancer      - No history of abnormal bleeding or antiplatelet use.          Different anesthesia methods/types have been discussed with the patient, but they are aware that the final plan will be decided by the assigned anesthesia provider on the date of " service.      The patient is optimized for their procedure. AVS with information on surgery time/arrival time, meds and NPO status given by nursing staff. No further diagnostic testing indicated.      On the day of service:     Prep time: 13 minutes  Visit time: 20 minutes  Documentation time: 8 minutes  ------------------------------------------  Total time: 41 minutes      TAMARA Navas CNP  Preoperative Assessment Center  [facility]  Clinic and Surgery Center  Phone: [phone]  Fax: [phone]

## 2024-04-11 NOTE — DISCHARGE INSTRUCTIONS

## 2024-04-11 NOTE — PATIENT INSTRUCTIONS
Preparing for Your Surgery      Name:  Basil Conteh   MRN:  [id number]   :  1955   Today's Date:  2024       Arriving for surgery:  Surgery date:  2024  Arrival time:  6:00 am    Please come to:     Please come to:       [facility]    [address] [place]  [address]     The [facility] [place] Patient/Visitor Ramp is at [address]. Patients and visitors who self-park will receive the reduced hospital parking rate. If the Patient /Visitor Ramp is full, please follow the signs to the [email or web address] car park located at the main hospital entrance.       parking is available (24 hours/ 7 days a week)      Discounted parking pass options are available for patients and visitors. They can be purchased at the [employer] desk at the main hospital entrance.     -    Stop at the security desk and they will direct surgery patients to the [facility] [phone]        - If you need directions, a wheelchair or an escort please stop at the Information/security desk in the lobby.     What can I eat or drink?  -  You may eat and drink normally up to 8 hours prior to arrival time. (Until Midnight)  -  You may have clear liquids until 2 hours prior to arrival time. (Until 4 am)    Examples of clear liquids:  Water  Clear broth  Juices (apple, white grape, white cranberry  and cider) without pulp  Noncarbonated, powder based beverages  (lemonade and Zafar-Aid)  Sodas (Sprite, 7-Up, ginger ale and seltzer)  Coffee or tea (without milk or cream)  Gatorade    -  No Alcohol or cannabis products for at least 24 hours before surgery.     Which medicines can I take?    Hold Aspirin for 7 days before surgery.   Hold Multivitamins for 7 days before surgery.  Hold Supplements for 7 days before surgery.  Hold Ibuprofen (Advil, Motrin) for 1 day(s) before surgery--unless otherwise  directed by surgeon.  Hold Naproxen (Aleve) for 4 days before surgery.    -  DO NOT take these medications the day of surgery:  Diphenhydramine (Benadryl)          -  PLEASE TAKE these medications the day of surgery:  Allopurinal   Amlodipine   Omeprazole       How do I prepare myself?  - Please take 2 showers (one the night prior to surgery and one the morning of surgery) using Scrubcare or Hibiclens soap.    Use this soap only from the neck to your toes.     Leave the soap on your skin for one minute--then rinse thoroughly.      You may use your own shampoo and conditioner. No other hair products.   - Please remove all jewelry and body piercings.  - No lotions, deodorants or fragrance.  - No makeup or fingernail polish.   - Bring your ID and insurance card.      -Begin Amlodipine as prescribed  -Check your Blood pressure daily  - The Nurse coordinator will call you next week to get your daily blood pressure results  -Make an appointment with your primary MD about treatment for high blood pressure     -If you use a CPAP machine, please bring the CPAP machine, tubing, and mask to hospital.    -If you have a Deep Brain Stimulator, Spinal Cord Stimulator, or any Neuro Stimulator device---you must bring the remote control to the hospital.      ALL PATIENTS GOING HOME THE SAME DAY OF SURGERY ARE REQUIRED TO HAVE A RESPONSIBLE ADULT TO DRIVE AND BE IN ATTENDANCE WITH THEM FOR 24 HOURS FOLLOWING SURGERY.    Covid testing policy as of 12/06/2022  Your surgeon will notify and schedule you for a COVID test if one is needed before surgery--please direct any questions or COVID symptoms to your surgeon      Questions or Concerns:    - For any questions regarding the day of surgery or your hospital stay, please contact the Pre Admission Nursing Office at [phone].       - If you have health changes between today and your surgery, please call your surgeon.       - For questions after surgery, please call your surgeons office.            Current Visitor Guidelines    You may have 2 visitors in the pre op area.    Visiting hours: 8 a.m. to 8:30 p.m.    Patients confirmed or suspected to have symptoms of COVID 19 or flu:     No visitors allowed for adult patients.   Children (under age 18) can have 1 named visitor.     People who are sick or showing symptoms of COVID 19 or flu:    Are not allowed to visit patients--we can only make exceptions in special situations.       Please follow these guidelines for your visit:          Please maintain social distance          Masking is optional--however at times you may be asked to wear a mask for the safety of yourself and others     Clean your hands with alcohol hand . Do this when you arrive at and leave the building and patient room,    And again after you touch your mask or anything in the room.     Go directly to and from the room you are visiting.     Stay in the patient s room during your visit. Limit going to other places in the hospital as much as possible     Leave bags and jackets at home or in the car.     For everyone s health, please don t come and go during your visit. That includes for smoking   during your visit.

## 2024-04-11 NOTE — TELEPHONE ENCOUNTER
Using a Cantonese  by phone, mike Castaneda that Ritika Amezcua NP, would like Basil to have an echo prior to surgery.  So [place] [facility]  She expressed understanding.  Liana Burton RN

## 2024-04-12 ENCOUNTER — TELEPHONE (OUTPATIENT)
Dept: SURGERY | Facility: CLINIC | Age: 69
End: 2024-04-12
Payer: COMMERCIAL

## 2024-04-12 NOTE — TELEPHONE ENCOUNTER
Using a Cantonese  by phone, left a voice message for the patient updating him that - his labs and echo are good for surgery. Liana Burton RN

## 2024-04-12 NOTE — RESULT ENCOUNTER NOTE
Quinten Shepherd    Can you please call the patient today and let him know that his labs and echo are good for surgery.    Thank you!  Ritika Amezcua DNP, RN, ANP-C

## 2024-04-13 RX ORDER — ENOXAPARIN SODIUM 100 MG/ML
40 INJECTION SUBCUTANEOUS
Status: CANCELLED | OUTPATIENT
Start: 2024-04-13

## 2024-04-13 RX ORDER — LEVOFLOXACIN 5 MG/ML
500 INJECTION, SOLUTION INTRAVENOUS ONCE
Status: CANCELLED | OUTPATIENT
Start: 2024-04-13 | End: 2024-04-13

## 2024-04-13 RX ORDER — METRONIDAZOLE 500 MG/100ML
500 INJECTION, SOLUTION INTRAVENOUS ONCE
Status: CANCELLED | OUTPATIENT
Start: 2024-04-13 | End: 2024-04-13

## 2024-04-16 RX ORDER — AMLODIPINE BESYLATE 2.5 MG/1
2.5 TABLET ORAL DAILY
Qty: 30 TABLET | Refills: 1 | Status: SHIPPED | OUTPATIENT
Start: 2024-04-16 | End: 2024-04-21

## 2024-04-17 ENCOUNTER — TELEPHONE (OUTPATIENT)
Dept: GASTROENTEROLOGY | Facility: CLINIC | Age: 69
End: 2024-04-17

## 2024-04-17 ENCOUNTER — ONCOLOGY VISIT (OUTPATIENT)
Dept: SURGERY | Facility: CLINIC | Age: 69
End: 2024-04-17
Attending: SURGERY
Payer: COMMERCIAL

## 2024-04-17 VITALS
SYSTOLIC BLOOD PRESSURE: 151 MMHG | BODY MASS INDEX: 21.66 KG/M2 | WEIGHT: 130 LBS | HEIGHT: 65 IN | HEART RATE: 96 BPM | DIASTOLIC BLOOD PRESSURE: 102 MMHG | OXYGEN SATURATION: 98 % | RESPIRATION RATE: 16 BRPM

## 2024-04-17 DIAGNOSIS — K86.89 PANCREATIC MASS: ICD-10-CM

## 2024-04-17 DIAGNOSIS — D49.0 IPMN (INTRADUCTAL PAPILLARY MUCINOUS NEOPLASM): Primary | ICD-10-CM

## 2024-04-17 DIAGNOSIS — K86.89 PANCREATIC MASS: Primary | ICD-10-CM

## 2024-04-17 PROCEDURE — 36415 COLL VENOUS BLD VENIPUNCTURE: CPT | Performed by: SURGERY

## 2024-04-17 PROCEDURE — 86301 IMMUNOASSAY TUMOR CA 19-9: CPT | Mod: GA | Performed by: SURGERY

## 2024-04-17 PROCEDURE — 90471 IMMUNIZATION ADMIN: CPT | Performed by: SURGERY

## 2024-04-17 PROCEDURE — 250N000021 HC RX MED A9270 GY (STAT IND- M) 250: Performed by: SURGERY

## 2024-04-17 PROCEDURE — 99215 OFFICE O/P EST HI 40 MIN: CPT | Performed by: SURGERY

## 2024-04-17 PROCEDURE — 90620 MENB-4C VACCINE IM: CPT | Performed by: SURGERY

## 2024-04-17 RX ORDER — METHYLPREDNISOLONE SODIUM SUCCINATE 125 MG/2ML
125 INJECTION, POWDER, LYOPHILIZED, FOR SOLUTION INTRAMUSCULAR; INTRAVENOUS
Start: 2024-05-15

## 2024-04-17 RX ORDER — EPINEPHRINE 1 MG/ML
0.3 INJECTION, SOLUTION INTRAMUSCULAR; SUBCUTANEOUS EVERY 5 MIN PRN
OUTPATIENT
Start: 2024-05-15

## 2024-04-17 RX ORDER — ALBUTEROL SULFATE 0.83 MG/ML
2.5 SOLUTION RESPIRATORY (INHALATION)
OUTPATIENT
Start: 2024-05-15

## 2024-04-17 RX ORDER — ALBUTEROL SULFATE 90 UG/1
1-2 AEROSOL, METERED RESPIRATORY (INHALATION)
Start: 2024-05-15

## 2024-04-17 RX ORDER — MEPERIDINE HYDROCHLORIDE 25 MG/ML
25 INJECTION INTRAMUSCULAR; INTRAVENOUS; SUBCUTANEOUS EVERY 30 MIN PRN
OUTPATIENT
Start: 2024-05-15

## 2024-04-17 RX ORDER — DIPHENHYDRAMINE HYDROCHLORIDE 50 MG/ML
50 INJECTION INTRAMUSCULAR; INTRAVENOUS
Start: 2024-05-15

## 2024-04-17 RX ADMIN — NEISSERIA MENINGITIDIS SEROGROUP B NHBA FUSION PROTEIN ANTIGEN, NEISSERIA MENINGITIDIS SEROGROUP B FHBP FUSION PROTEIN ANTIGEN AND NEISSERIA MENINGITIDIS SEROGROUP B NADA PROTEIN ANTIGEN 0.5 ML: 50; 50; 50; 25 INJECTION, SUSPENSION INTRAMUSCULAR at 15:35

## 2024-04-17 ASSESSMENT — PAIN SCALES - GENERAL: PAINLEVEL: EXTREME PAIN (8)

## 2024-04-17 NOTE — TELEPHONE ENCOUNTER
Advanced Endoscopy     Referring provider:  Lito Rios MD     Referred to: [facility] Provider [facility]     Provider Requested:  none specified     Referral Received: 4/17/24     Records received:  [facility]    CT abd/pelvis with contrast 4/11/24  IMPRESSION: History of metastatic prostate cancer and pancreatic cyst  with prior EUS findings suggestive of mucinous cystic neoplasm, most  likely IPMN.      1. Interval enlargement of cystic lesion along the pancreatic distal  body/tail, measuring up to 7.9 cm, previously up to 5.3 cm. Posterior  margin of the cyst is inseparable from the bulky retroperitoneal  adenopathy. Cyst demonstrates high-risk features with increased  ill-defined nodular enhancing areas along the superior and  posteromedial aspects.   2. Persistent splenic vein occlusion with dilated perisplenic and  perigastric venous collaterals.  3. Findings of peritoneal carcinomatosis with new irregular  peripherally enhancing lesion in the rectovesical space, new 4.6 cm  soft tissue thickening in the perihepatic space and enhancing soft  tissue nodule in the omentum.   4. Decreased retroperitoneal and iliac chain lymphadenopathy.  5. Mild concentric gallbladder wall thickening/edema is favored  Reactive    MR abd with contrast 8/15/23  IMPRESSION:   1a. Thick-walled cystic structure along the pancreatic tail with a partial septation along the internal margin. While a pseudocyst remains within the differential, the capsular irregularity raises suspicion for cystic neoplasm. Recommend GI consultation for further evaluation.   1b. Additional subcentimeter cystic lesion within the pancreatic body. This lesion likely represents a sidebranch IPMN. Attention on follow-up.   2. Numerous T2 hyperintense hepatic cysts. No suspicious enhancing hepatic lesions.   3. Extensive retroperitoneal adenopathy encompassing the abdominal aorta      Images received: PACs    Insurance Coverage: [facility]    Evaluation for:   pancreatic mass, rapidly growing pancreas mass very concerning for pancreas cancer.      Clinical History (per RN review):     Referring providers office visit 3/13/24  Mr. Conteh is a 68-year-old Cantonese gentleman who presents with his son today.  He has a recent history of metastatic prostate cancer with significant periaortic lymphadenopathy extending throughout his abdomen.  He is also been found to have a large pancreatic cyst measuring approximately 6 cm.  It is abutting an area of massive lymphadenopathy from his prostate cancer.  He recently began treatment for the prostate cancer and his PSA has now normalized.  His most recent imaging is from August of last year.     The pancreatic cyst has been assessed with endoscopic ultrasound.  The fluid analysis was consistent with IPMN in that it had a elevated amylase, elevated CEA, and a glucose of 10.  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     The patient has left upper quadrant pain which is constant and has been persistent for many months now.    On physical examination his abdomen is scaphoid, it is soft, it is benign.  Palpation does not elicit any pain.  I cannot easily identify significant adenopathy seen on his previous scans, and I wonder if it is improved with treatment.     The patient's prostate cancer team feels that with medical management he could have a a life expectancy of 10 years or more.  With this in mind I was asked to see him for consideration of distal pancreatectomy for treatment of his pancreatic cyst.    MD review date:   MD Decision for clinic consultation/Orders:            Referral updates/Patient contacted:

## 2024-04-17 NOTE — PROGRESS NOTES
Mr. Jose presents with his wife today for discussion regarding repeat imaging prior to surgery planned for next week.    New CT scan reveals increasing soft tissue surrounding critical blood vessels in the celiac trunk.  This appears confluent with the area of the pancreatic cyst that was previously seen but significantly more soft tissue density which is concerning for pancreatic malignancy.  In addition the CT comments on possible peritoneal studding.    I discussed with the patient and his wife that there has been significant change in his scans and that I am concerned that this represents pancreatic cancer rather than a benign cyst.  I discussed that we would have to cancel his upcoming surgery and plan for an urgent biopsy as quickly as possible so that we can have a definitive diagnosis.  Based on the extent of his disease this will not be surgically resectable but the biopsy will be important for alternative treatment planning.  In addition we will add a CA 19-9 to his labs today.    The visit was performed with an  today.  The patient and his wife seem to understand the situation although clearly disappointed by it.  We have put in an urgent referral to GI for an endoscopic ultrasound for biopsy of his mass.  In addition we we will communicate with his oncologist office so that they are aware of the situation.    Follow-up after biopsies to discuss results.  20 minutes was spent in face-to-face time.  20 minutes was spent in review of history, imaging, coordination of care.  5 minutes were spent documentation.

## 2024-04-17 NOTE — LETTER
"    4/17/2024         RE: Basil Conteh  [address]  [place] [address]        Dear Colleague,    Thank you for referring your patient, Basil Conteh, to the [facility]. Please see a copy of my visit note below.    Oncology Rooming Note    April 17, 2024 2:46 PM   Basil Conteh is a 68 year old male who presents for:    Chief Complaint   Patient presents with     Oncology Clinic Visit     Initial Vitals: Resp 16   Ht 1.651 m (5' 5\")   Wt 59 kg (130 lb)   BMI 21.63 kg/m   Estimated body mass index is 21.63 kg/m  as calculated from the following:    Height as of this encounter: 1.651 m (5' 5\").    Weight as of this encounter: 59 kg (130 lb). Body surface area is 1.64 meters squared.  Extreme Pain (8) Comment: Data Unavailable   No LMP for male patient.  Allergies reviewed: Yes  Medications reviewed: Yes    Medications: Medication refills not needed today.  Pharmacy name entered into logtrust: [facility] #[id number] - [place], [place] - [address] AT [facility] & [facility] [address]    Frailty Screening:   Is the patient here for a new oncology consult visit in cancer care? 2. No        Ailin Hill MA              Mr. Jose presents with his wife today for discussion regarding repeat imaging prior to surgery planned for next week.    New CT scan reveals increasing soft tissue surrounding critical blood vessels in the celiac trunk.  This appears confluent with the area of the pancreatic cyst that was previously seen but significantly more soft tissue density which is concerning for pancreatic malignancy.  In addition the CT comments on possible peritoneal studding.    I discussed with the patient and his wife that there has been significant change in his scans and that I am concerned that this represents pancreatic cancer rather than a benign cyst.  I discussed that we would have to cancel his upcoming surgery and plan for an urgent biopsy as quickly as possible so that we " can have a definitive diagnosis.  Based on the extent of his disease this will not be surgically resectable but the biopsy will be important for alternative treatment planning.  In addition we will add a CA 19-9 to his labs today.    The visit was performed with an  today.  The patient and his wife seem to understand the situation although clearly disappointed by it.  We have put in an urgent referral to GI for an endoscopic ultrasound for biopsy of his mass.  In addition we we will communicate with his oncologist office so that they are aware of the situation.    Follow-up after biopsies to discuss results.  20 minutes was spent in face-to-face time.  20 minutes was spent in review of history, imaging, coordination of care.  5 minutes were spent documentation.        Again, thank you for allowing me to participate in the care of your patient.        Sincerely,        Lito Rios MD

## 2024-04-17 NOTE — PROGRESS NOTES
"Oncology Rooming Note    April 17, 2024 2:46 PM   Basil Conteh is a 68 year old male who presents for:    Chief Complaint   Patient presents with    Oncology Clinic Visit     Initial Vitals: Resp 16   Ht 1.651 m (5' 5\")   Wt 59 kg (130 lb)   BMI 21.63 kg/m   Estimated body mass index is 21.63 kg/m  as calculated from the following:    Height as of this encounter: 1.651 m (5' 5\").    Weight as of this encounter: 59 kg (130 lb). Body surface area is 1.64 meters squared.  Extreme Pain (8) Comment: Data Unavailable   No LMP for male patient.  Allergies reviewed: Yes  Medications reviewed: Yes    Medications: Medication refills not needed today.  Pharmacy name entered into [employer]: [facility] #[id number] - [place] [address] [facility] [address]    Frailty Screening:   Is the patient here for a new oncology consult visit in cancer care? 2. No        Ailin Hill MA            "

## 2024-04-18 ENCOUNTER — PATIENT OUTREACH (OUTPATIENT)
Dept: ONCOLOGY | Facility: CLINIC | Age: 69
End: 2024-04-18
Payer: COMMERCIAL

## 2024-04-18 NOTE — PROGRESS NOTES
Called to update care team about scan/terminating pancreatectomy   Left detailed message with Karolyn with direct number for CB   Planning EUS with Dr. Plata's team;   Basil will need pain referral.       Carolyn Thorpe, RN, BSN  Specialty Care Coordinator  [facility]  [phone]

## 2024-04-19 ENCOUNTER — PATIENT OUTREACH (OUTPATIENT)
Dept: GASTROENTEROLOGY | Facility: CLINIC | Age: 69
End: 2024-04-19
Payer: COMMERCIAL

## 2024-04-19 ENCOUNTER — PREP FOR PROCEDURE (OUTPATIENT)
Dept: GASTROENTEROLOGY | Facility: CLINIC | Age: 69
End: 2024-04-19
Payer: COMMERCIAL

## 2024-04-19 DIAGNOSIS — K86.89 PANCREATIC MASS: Primary | ICD-10-CM

## 2024-04-19 LAB — CANCER AG19-9 SERPL IA-ACNC: 1198 U/ML

## 2024-04-19 NOTE — TELEPHONE ENCOUNTER
Called pt with use of . No answer on home line. Left VM on mobile number. No other contact information.     Procedure/Imaging/Clinic: Upper EUS   Physician: David   Timing: Within 7-10 days - Perhaps OR 4/23?   Scope time needed:Standard for location   Anesthesia:MAC   Dx: Pancreatic cystic mass   Tier:Tier 2 - Not life/limb threatening but potential for  patient morbidity/mortality or adverse  patient/disease outcome if  surgery/procedure not done within 30 day   Location: [facility] vs [facility] based on availability   Header of letter for pt communication:    Repeat endoscopic ultrasound examination with biopsy of pancreatic lesion.     1400    Pt's wife returned phone call, would like to schedule on Weds 4/24.    Explained the OR will call 1-2 days prior to procedure date with arrival time, will need a , someone to stay with them for 24 hours and should stay in town for 24 hours (within 45 min of Hospital) post procedure    Patient needs to get pre-op physical completed. If outside  health system will need physical faxed to number [phone]     If you do not get a preop physical, your procedure could be cancelled, patient voiced understanding*    Preop Plan: PAC visit completed on 4/11 in prep for surgery, this visit can be utilized for this procedure purposes.    Does patient have any history of gastric bypass/gastric surgery/altered panc/bili anatomy? none    Any recent Covid symptoms or positive covid test? No current symptoms, discussed if symptomatic to test at home    Does patient have [employer] insurance?:BCBS    Med Review    Blood thinner - none , advised he hold advil for 24 hours prior to procedure  ASA - none   Diabetic -    Any meds by injection or mouth for weight loss or diabetes- none    Patient Education r/t procedure: letter in mail, [email or web address]    A pre-op nurse will call 1-2 days prior to the procedure.    NPO/Prep:   Adults and Children of all ages may consume solids up to 8  hours prior to arrival time - may consume clear liquids up to 1 hour prior to arrival time.    Verbalized understanding of all instructions. All questions answered.     Procedure order placed, message routed to OR        Lucina Jacobs RN, BSN,   [facility]  Care coordinator

## 2024-04-21 ENCOUNTER — TELEPHONE (OUTPATIENT)
Dept: SURGERY | Facility: CLINIC | Age: 69
End: 2024-04-21
Payer: COMMERCIAL

## 2024-04-21 RX ORDER — AMLODIPINE BESYLATE 5 MG/1
5 TABLET ORAL DAILY
Qty: 30 TABLET | Refills: 1 | Status: SHIPPED | OUTPATIENT
Start: 2024-04-21

## 2024-04-21 NOTE — TELEPHONE ENCOUNTER
RE: echo and BP follow up  Received: 2 days ago  Ritika Amezcua APRN CNP White, Jennifer, RN  Okay. Would you mind calling and having him increase to 5mg daily by just taking 2 tablets per day at the same time?  I can send a new prescription for them to  later once he uses the 2.5mg tabs up.      Thanks!  Ritika          Previous Messages       ----- Message -----  From: Liana Burton RN  Sent: 4/18/2024   2:26 PM CDT  To: TAMARA Navas CNP  Subject: FW: echo and BP follow up                        Quinten Sanonley,    Using a Naehas  I talked to Basil' wife.  She said -    Just started taking it 2 days ago, and the range is this -    150-157/105    I asked her if the bottom number has been 105 the whole time and she said  - 'yes, it's about the same' but wasn't able to provide me a range.    Do you want me to document or do you just put it in your note?  Thanks  ----- Message -----  From: Liana Burton RN  Sent: 4/18/2024  12:00 AM CDT  To: Liana Burton RN  Subject: FW: echo and BP follow up                        F/up blood pressure  ----- Message -----  From: Ritika Amezcua APRN CNP  Sent: 4/11/2024   1:32 PM CDT  To: Liana Burton RN  Subject: echo and BP follow up                            Quinten Shepherd    1) After the patient left and I as thinking through his case more, I decided that I would like to have him get an echocardiogram prior to surgery.  Can you please call his cell phone number (might go to his wife which they said is okay) and help him get this scheduled?  I put if for here, but Ambar would be fine too as they live in [place].    2) His blood pressure has been quite high for about a year and he wasn't sure if he would be able to get in with his primary care provider any time soon.  I started him on 2.5mg of amlodipine daily and asked that he purchase a blood pressure cuff for home monitoring.  He is going to check daily and write it down.   Would you be able to follow up with him on Thursday next week to get his readings please?      They don't use [email or web address].  An  will need to be used for the call unless his son answers.      Thank you!  Ritika

## 2024-04-22 ENCOUNTER — TELEPHONE (OUTPATIENT)
Dept: SURGERY | Facility: CLINIC | Age: 69
End: 2024-04-22
Payer: COMMERCIAL

## 2024-04-22 NOTE — TELEPHONE ENCOUNTER
This RN called both Basil and his wife twice on Friday 4/19, to discuss increasing Amlodipine to 5 mg daily (as recommended by Ritika Amezcua NP, in the PAC clinic). There was no answer, was able to leave a voice message with a [employer] phone .  Also called again today and left a detailed voice message, with a Cantonese .  Provided a call back number.  Liana Burton RN

## 2024-04-23 ENCOUNTER — ANESTHESIA EVENT (OUTPATIENT)
Dept: SURGERY | Facility: CLINIC | Age: 69
End: 2024-04-23
Payer: COMMERCIAL

## 2024-04-24 ENCOUNTER — HOSPITAL ENCOUNTER (OUTPATIENT)
Facility: CLINIC | Age: 69
Setting detail: OBSERVATION
Discharge: HOME | End: 2024-04-25
Attending: INTERNAL MEDICINE | Admitting: STUDENT IN AN ORGANIZED HEALTH CARE EDUCATION/TRAINING PROGRAM
Payer: COMMERCIAL

## 2024-04-24 ENCOUNTER — ANESTHESIA (OUTPATIENT)
Dept: SURGERY | Facility: CLINIC | Age: 69
End: 2024-04-24
Payer: COMMERCIAL

## 2024-04-24 ENCOUNTER — APPOINTMENT (OUTPATIENT)
Dept: CT IMAGING | Facility: CLINIC | Age: 69
End: 2024-04-24
Attending: INTERNAL MEDICINE
Payer: COMMERCIAL

## 2024-04-24 DIAGNOSIS — K86.2 PANCREATIC CYST: Primary | ICD-10-CM

## 2024-04-24 LAB
ABO/RH(D): NORMAL
ANTIBODY SCREEN: NEGATIVE
ERYTHROCYTE [DISTWIDTH] IN BLOOD BY AUTOMATED COUNT: 13.1 % (ref 10–15)
HCT VFR BLD AUTO: 29.5 % (ref 40–53)
HGB BLD-MCNC: 10 G/DL (ref 13.3–17.7)
HGB BLD-MCNC: 12.3 G/DL (ref 13.3–17.7)
MCH RBC QN AUTO: 29.6 PG (ref 26.5–33)
MCHC RBC AUTO-ENTMCNC: 33.9 G/DL (ref 31.5–36.5)
MCV RBC AUTO: 87 FL (ref 78–100)
PLATELET # BLD AUTO: 160 10E3/UL (ref 150–450)
RBC # BLD AUTO: 3.38 10E6/UL (ref 4.4–5.9)
SPECIMEN EXPIRATION DATE: NORMAL
WBC # BLD AUTO: 6.2 10E3/UL (ref 4–11)

## 2024-04-24 PROCEDURE — 250N000011 HC RX IP 250 OP 636: Performed by: INTERNAL MEDICINE

## 2024-04-24 PROCEDURE — 88305 TISSUE EXAM BY PATHOLOGIST: CPT | Mod: TC | Performed by: INTERNAL MEDICINE

## 2024-04-24 PROCEDURE — 250N000011 HC RX IP 250 OP 636: Performed by: ANESTHESIOLOGY

## 2024-04-24 PROCEDURE — 99222 1ST HOSP IP/OBS MODERATE 55: CPT | Performed by: STUDENT IN AN ORGANIZED HEALTH CARE EDUCATION/TRAINING PROGRAM

## 2024-04-24 PROCEDURE — G0378 HOSPITAL OBSERVATION PER HR: HCPCS

## 2024-04-24 PROCEDURE — 258N000003 HC RX IP 258 OP 636: Performed by: NURSE ANESTHETIST, CERTIFIED REGISTERED

## 2024-04-24 PROCEDURE — 250N000013 HC RX MED GY IP 250 OP 250 PS 637: Performed by: STUDENT IN AN ORGANIZED HEALTH CARE EDUCATION/TRAINING PROGRAM

## 2024-04-24 PROCEDURE — 86900 BLOOD TYPING SEROLOGIC ABO: CPT | Performed by: INTERNAL MEDICINE

## 2024-04-24 PROCEDURE — 999N000141 HC STATISTIC PRE-PROCEDURE NURSING ASSESSMENT: Performed by: INTERNAL MEDICINE

## 2024-04-24 PROCEDURE — 710N000010 HC RECOVERY PHASE 1, LEVEL 2, PER MIN: Performed by: INTERNAL MEDICINE

## 2024-04-24 PROCEDURE — 85027 COMPLETE CBC AUTOMATED: CPT | Performed by: INTERNAL MEDICINE

## 2024-04-24 PROCEDURE — 250N000009 HC RX 250: Performed by: INTERNAL MEDICINE

## 2024-04-24 PROCEDURE — 85018 HEMOGLOBIN: CPT | Performed by: STUDENT IN AN ORGANIZED HEALTH CARE EDUCATION/TRAINING PROGRAM

## 2024-04-24 PROCEDURE — 272N000001 HC OR GENERAL SUPPLY STERILE: Performed by: INTERNAL MEDICINE

## 2024-04-24 PROCEDURE — 88173 CYTOPATH EVAL FNA REPORT: CPT | Mod: 26 | Performed by: PATHOLOGY

## 2024-04-24 PROCEDURE — 250N000011 HC RX IP 250 OP 636: Performed by: STUDENT IN AN ORGANIZED HEALTH CARE EDUCATION/TRAINING PROGRAM

## 2024-04-24 PROCEDURE — 88172 CYTP DX EVAL FNA 1ST EA SITE: CPT | Mod: 26 | Performed by: PATHOLOGY

## 2024-04-24 PROCEDURE — 43238 EGD US FINE NEEDLE BX/ASPIR: CPT | Performed by: NURSE ANESTHETIST, CERTIFIED REGISTERED

## 2024-04-24 PROCEDURE — 370N000017 HC ANESTHESIA TECHNICAL FEE, PER MIN: Performed by: INTERNAL MEDICINE

## 2024-04-24 PROCEDURE — 96374 THER/PROPH/DIAG INJ IV PUSH: CPT | Mod: 59

## 2024-04-24 PROCEDURE — 250N000009 HC RX 250: Performed by: NURSE ANESTHETIST, CERTIFIED REGISTERED

## 2024-04-24 PROCEDURE — 96375 TX/PRO/DX INJ NEW DRUG ADDON: CPT

## 2024-04-24 PROCEDURE — 36415 COLL VENOUS BLD VENIPUNCTURE: CPT | Performed by: INTERNAL MEDICINE

## 2024-04-24 PROCEDURE — 360N000076 HC SURGERY LEVEL 3, PER MIN: Performed by: INTERNAL MEDICINE

## 2024-04-24 PROCEDURE — 250N000011 HC RX IP 250 OP 636: Performed by: NURSE ANESTHETIST, CERTIFIED REGISTERED

## 2024-04-24 PROCEDURE — 36415 COLL VENOUS BLD VENIPUNCTURE: CPT | Performed by: STUDENT IN AN ORGANIZED HEALTH CARE EDUCATION/TRAINING PROGRAM

## 2024-04-24 PROCEDURE — 43238 EGD US FINE NEEDLE BX/ASPIR: CPT | Performed by: ANESTHESIOLOGY

## 2024-04-24 PROCEDURE — 88305 TISSUE EXAM BY PATHOLOGIST: CPT | Mod: 26 | Performed by: PATHOLOGY

## 2024-04-24 PROCEDURE — 74177 CT ABD & PELVIS W/CONTRAST: CPT

## 2024-04-24 PROCEDURE — 74177 CT ABD & PELVIS W/CONTRAST: CPT | Mod: 26 | Performed by: RADIOLOGY

## 2024-04-24 PROCEDURE — 250N000013 HC RX MED GY IP 250 OP 250 PS 637: Performed by: INTERNAL MEDICINE

## 2024-04-24 RX ORDER — NALOXONE HYDROCHLORIDE 0.4 MG/ML
0.2 INJECTION, SOLUTION INTRAMUSCULAR; INTRAVENOUS; SUBCUTANEOUS
Status: DISCONTINUED | OUTPATIENT
Start: 2024-04-24 | End: 2024-04-24

## 2024-04-24 RX ORDER — PROPOFOL 10 MG/ML
INJECTION, EMULSION INTRAVENOUS CONTINUOUS PRN
Status: DISCONTINUED | OUTPATIENT
Start: 2024-04-24 | End: 2024-04-24

## 2024-04-24 RX ORDER — ONDANSETRON 2 MG/ML
4 INJECTION INTRAMUSCULAR; INTRAVENOUS EVERY 6 HOURS PRN
Status: DISCONTINUED | OUTPATIENT
Start: 2024-04-24 | End: 2024-04-24

## 2024-04-24 RX ORDER — HYDROMORPHONE HCL IN WATER/PF 6 MG/30 ML
0.4 PATIENT CONTROLLED ANALGESIA SYRINGE INTRAVENOUS EVERY 5 MIN PRN
Status: DISCONTINUED | OUTPATIENT
Start: 2024-04-24 | End: 2024-04-24 | Stop reason: HOSPADM

## 2024-04-24 RX ORDER — PROPOFOL 10 MG/ML
INJECTION, EMULSION INTRAVENOUS PRN
Status: DISCONTINUED | OUTPATIENT
Start: 2024-04-24 | End: 2024-04-24

## 2024-04-24 RX ORDER — NALOXONE HYDROCHLORIDE 0.4 MG/ML
0.1 INJECTION, SOLUTION INTRAMUSCULAR; INTRAVENOUS; SUBCUTANEOUS
Status: CANCELLED | OUTPATIENT
Start: 2024-04-24

## 2024-04-24 RX ORDER — ACETAMINOPHEN 500 MG
500-1000 TABLET ORAL EVERY MORNING
Status: DISCONTINUED | OUTPATIENT
Start: 2024-04-25 | End: 2024-04-25 | Stop reason: HOSPADM

## 2024-04-24 RX ORDER — EPINEPHRINE 1 MG/ML
INJECTION, SOLUTION, CONCENTRATE INTRAVENOUS PRN
Status: DISCONTINUED | OUTPATIENT
Start: 2024-04-24 | End: 2024-04-24 | Stop reason: HOSPADM

## 2024-04-24 RX ORDER — NALOXONE HYDROCHLORIDE 0.4 MG/ML
0.4 INJECTION, SOLUTION INTRAMUSCULAR; INTRAVENOUS; SUBCUTANEOUS
Status: DISCONTINUED | OUTPATIENT
Start: 2024-04-24 | End: 2024-04-24

## 2024-04-24 RX ORDER — AMOXICILLIN 250 MG
2 CAPSULE ORAL 2 TIMES DAILY PRN
Status: DISCONTINUED | OUTPATIENT
Start: 2024-04-24 | End: 2024-04-25 | Stop reason: HOSPADM

## 2024-04-24 RX ORDER — OMEPRAZOLE 20 MG/1
20 TABLET, DELAYED RELEASE ORAL
Status: DISCONTINUED | OUTPATIENT
Start: 2024-04-25 | End: 2024-04-24

## 2024-04-24 RX ORDER — OXYCODONE HYDROCHLORIDE 10 MG/1
10 TABLET ORAL
Status: CANCELLED | OUTPATIENT
Start: 2024-04-24

## 2024-04-24 RX ORDER — ALLOPURINOL 100 MG/1
200 TABLET ORAL EVERY MORNING
Status: DISCONTINUED | OUTPATIENT
Start: 2024-04-25 | End: 2024-04-25 | Stop reason: HOSPADM

## 2024-04-24 RX ORDER — PIPERACILLIN SODIUM, TAZOBACTAM SODIUM 3; .375 G/15ML; G/15ML
3.38 INJECTION, POWDER, LYOPHILIZED, FOR SOLUTION INTRAVENOUS EVERY 6 HOURS
Status: DISCONTINUED | OUTPATIENT
Start: 2024-04-24 | End: 2024-04-25

## 2024-04-24 RX ORDER — LIDOCAINE HYDROCHLORIDE 20 MG/ML
INJECTION, SOLUTION INFILTRATION; PERINEURAL PRN
Status: DISCONTINUED | OUTPATIENT
Start: 2024-04-24 | End: 2024-04-24

## 2024-04-24 RX ORDER — ONDANSETRON 2 MG/ML
4 INJECTION INTRAMUSCULAR; INTRAVENOUS EVERY 6 HOURS PRN
Status: CANCELLED | OUTPATIENT
Start: 2024-04-24

## 2024-04-24 RX ORDER — SODIUM CHLORIDE, SODIUM LACTATE, POTASSIUM CHLORIDE, CALCIUM CHLORIDE 600; 310; 30; 20 MG/100ML; MG/100ML; MG/100ML; MG/100ML
INJECTION, SOLUTION INTRAVENOUS CONTINUOUS
Status: DISCONTINUED | OUTPATIENT
Start: 2024-04-24 | End: 2024-04-24 | Stop reason: HOSPADM

## 2024-04-24 RX ORDER — NALOXONE HYDROCHLORIDE 0.4 MG/ML
0.2 INJECTION, SOLUTION INTRAMUSCULAR; INTRAVENOUS; SUBCUTANEOUS
Status: DISCONTINUED | OUTPATIENT
Start: 2024-04-24 | End: 2024-04-25 | Stop reason: HOSPADM

## 2024-04-24 RX ORDER — HYDROMORPHONE HCL IN WATER/PF 6 MG/30 ML
0.2 PATIENT CONTROLLED ANALGESIA SYRINGE INTRAVENOUS EVERY 4 HOURS PRN
Status: DISCONTINUED | OUTPATIENT
Start: 2024-04-24 | End: 2024-04-25 | Stop reason: HOSPADM

## 2024-04-24 RX ORDER — FENTANYL CITRATE 50 UG/ML
25 INJECTION, SOLUTION INTRAMUSCULAR; INTRAVENOUS EVERY 5 MIN PRN
Status: DISCONTINUED | OUTPATIENT
Start: 2024-04-24 | End: 2024-04-24 | Stop reason: HOSPADM

## 2024-04-24 RX ORDER — DIPHENHYDRAMINE HCL 25 MG
25 CAPSULE ORAL EVERY MORNING
Status: DISCONTINUED | OUTPATIENT
Start: 2024-04-25 | End: 2024-04-25 | Stop reason: HOSPADM

## 2024-04-24 RX ORDER — ONDANSETRON 4 MG/1
4 TABLET, ORALLY DISINTEGRATING ORAL EVERY 30 MIN PRN
Status: DISCONTINUED | OUTPATIENT
Start: 2024-04-24 | End: 2024-04-24 | Stop reason: HOSPADM

## 2024-04-24 RX ORDER — NALOXONE HYDROCHLORIDE 0.4 MG/ML
0.1 INJECTION, SOLUTION INTRAMUSCULAR; INTRAVENOUS; SUBCUTANEOUS
Status: DISCONTINUED | OUTPATIENT
Start: 2024-04-24 | End: 2024-04-24 | Stop reason: HOSPADM

## 2024-04-24 RX ORDER — ONDANSETRON 4 MG/1
4 TABLET, ORALLY DISINTEGRATING ORAL EVERY 6 HOURS PRN
Status: CANCELLED | OUTPATIENT
Start: 2024-04-24

## 2024-04-24 RX ORDER — IOPAMIDOL 755 MG/ML
77 INJECTION, SOLUTION INTRAVASCULAR ONCE
Status: COMPLETED | OUTPATIENT
Start: 2024-04-24 | End: 2024-04-24

## 2024-04-24 RX ORDER — SODIUM CHLORIDE, SODIUM LACTATE, POTASSIUM CHLORIDE, CALCIUM CHLORIDE 600; 310; 30; 20 MG/100ML; MG/100ML; MG/100ML; MG/100ML
INJECTION, SOLUTION INTRAVENOUS CONTINUOUS
Status: DISCONTINUED | OUTPATIENT
Start: 2024-04-24 | End: 2024-04-25 | Stop reason: HOSPADM

## 2024-04-24 RX ORDER — ESMOLOL HYDROCHLORIDE 10 MG/ML
INJECTION INTRAVENOUS PRN
Status: DISCONTINUED | OUTPATIENT
Start: 2024-04-24 | End: 2024-04-24

## 2024-04-24 RX ORDER — PANTOPRAZOLE SODIUM 40 MG/1
40 TABLET, DELAYED RELEASE ORAL
Status: DISCONTINUED | OUTPATIENT
Start: 2024-04-25 | End: 2024-04-25 | Stop reason: HOSPADM

## 2024-04-24 RX ORDER — AMLODIPINE BESYLATE 5 MG/1
5 TABLET ORAL DAILY
Status: DISCONTINUED | OUTPATIENT
Start: 2024-04-24 | End: 2024-04-25 | Stop reason: HOSPADM

## 2024-04-24 RX ORDER — ONDANSETRON 2 MG/ML
4 INJECTION INTRAMUSCULAR; INTRAVENOUS EVERY 6 HOURS PRN
Status: DISCONTINUED | OUTPATIENT
Start: 2024-04-24 | End: 2024-04-25 | Stop reason: HOSPADM

## 2024-04-24 RX ORDER — FENTANYL CITRATE 50 UG/ML
50 INJECTION, SOLUTION INTRAMUSCULAR; INTRAVENOUS EVERY 5 MIN PRN
Status: DISCONTINUED | OUTPATIENT
Start: 2024-04-24 | End: 2024-04-24 | Stop reason: HOSPADM

## 2024-04-24 RX ORDER — LIDOCAINE 40 MG/G
CREAM TOPICAL
Status: DISCONTINUED | OUTPATIENT
Start: 2024-04-24 | End: 2024-04-25 | Stop reason: HOSPADM

## 2024-04-24 RX ORDER — ONDANSETRON 2 MG/ML
4 INJECTION INTRAMUSCULAR; INTRAVENOUS EVERY 30 MIN PRN
Status: DISCONTINUED | OUTPATIENT
Start: 2024-04-24 | End: 2024-04-24 | Stop reason: HOSPADM

## 2024-04-24 RX ORDER — AMOXICILLIN 250 MG
2 CAPSULE ORAL 2 TIMES DAILY PRN
Status: CANCELLED | OUTPATIENT
Start: 2024-04-24

## 2024-04-24 RX ORDER — IBUPROFEN 200 MG
200 TABLET ORAL EVERY 4 HOURS PRN
Status: DISCONTINUED | OUTPATIENT
Start: 2024-04-24 | End: 2024-04-25 | Stop reason: HOSPADM

## 2024-04-24 RX ORDER — ONDANSETRON 4 MG/1
4 TABLET, ORALLY DISINTEGRATING ORAL EVERY 6 HOURS PRN
Status: DISCONTINUED | OUTPATIENT
Start: 2024-04-24 | End: 2024-04-25 | Stop reason: HOSPADM

## 2024-04-24 RX ORDER — ONDANSETRON 4 MG/1
4 TABLET, ORALLY DISINTEGRATING ORAL EVERY 6 HOURS PRN
Status: DISCONTINUED | OUTPATIENT
Start: 2024-04-24 | End: 2024-04-24

## 2024-04-24 RX ORDER — DIPHENHYDRAMINE HCL 25 MG
25 TABLET ORAL EVERY MORNING
Status: DISCONTINUED | OUTPATIENT
Start: 2024-04-25 | End: 2024-04-24

## 2024-04-24 RX ORDER — SODIUM CHLORIDE, SODIUM LACTATE, POTASSIUM CHLORIDE, CALCIUM CHLORIDE 600; 310; 30; 20 MG/100ML; MG/100ML; MG/100ML; MG/100ML
INJECTION, SOLUTION INTRAVENOUS CONTINUOUS PRN
Status: DISCONTINUED | OUTPATIENT
Start: 2024-04-24 | End: 2024-04-24

## 2024-04-24 RX ORDER — OXYCODONE HYDROCHLORIDE 5 MG/1
5 TABLET ORAL
Status: CANCELLED | OUTPATIENT
Start: 2024-04-24

## 2024-04-24 RX ORDER — ONDANSETRON 2 MG/ML
4 INJECTION INTRAMUSCULAR; INTRAVENOUS EVERY 30 MIN PRN
Status: CANCELLED | OUTPATIENT
Start: 2024-04-24

## 2024-04-24 RX ORDER — ONDANSETRON 2 MG/ML
INJECTION INTRAMUSCULAR; INTRAVENOUS PRN
Status: DISCONTINUED | OUTPATIENT
Start: 2024-04-24 | End: 2024-04-24

## 2024-04-24 RX ORDER — AMOXICILLIN 250 MG
1 CAPSULE ORAL 2 TIMES DAILY PRN
Status: DISCONTINUED | OUTPATIENT
Start: 2024-04-24 | End: 2024-04-25 | Stop reason: HOSPADM

## 2024-04-24 RX ORDER — OXYCODONE HYDROCHLORIDE 5 MG/1
5 TABLET ORAL EVERY 4 HOURS PRN
Status: DISCONTINUED | OUTPATIENT
Start: 2024-04-24 | End: 2024-04-25 | Stop reason: HOSPADM

## 2024-04-24 RX ORDER — AMOXICILLIN 250 MG
1 CAPSULE ORAL 2 TIMES DAILY PRN
Status: CANCELLED | OUTPATIENT
Start: 2024-04-24

## 2024-04-24 RX ORDER — HYDROMORPHONE HCL IN WATER/PF 6 MG/30 ML
0.2 PATIENT CONTROLLED ANALGESIA SYRINGE INTRAVENOUS EVERY 5 MIN PRN
Status: DISCONTINUED | OUTPATIENT
Start: 2024-04-24 | End: 2024-04-24 | Stop reason: HOSPADM

## 2024-04-24 RX ORDER — NALOXONE HYDROCHLORIDE 0.4 MG/ML
0.4 INJECTION, SOLUTION INTRAMUSCULAR; INTRAVENOUS; SUBCUTANEOUS
Status: DISCONTINUED | OUTPATIENT
Start: 2024-04-24 | End: 2024-04-25 | Stop reason: HOSPADM

## 2024-04-24 RX ORDER — FLUMAZENIL 0.1 MG/ML
0.2 INJECTION, SOLUTION INTRAVENOUS
Status: ACTIVE | OUTPATIENT
Start: 2024-04-24 | End: 2024-04-25

## 2024-04-24 RX ORDER — ONDANSETRON 4 MG/1
4 TABLET, ORALLY DISINTEGRATING ORAL EVERY 30 MIN PRN
Status: CANCELLED | OUTPATIENT
Start: 2024-04-24

## 2024-04-24 RX ADMIN — LIDOCAINE HYDROCHLORIDE 60 MG: 20 INJECTION, SOLUTION INFILTRATION; PERINEURAL at 10:20

## 2024-04-24 RX ADMIN — PROCHLORPERAZINE EDISYLATE 5 MG: 5 INJECTION INTRAMUSCULAR; INTRAVENOUS at 13:02

## 2024-04-24 RX ADMIN — OXYCODONE HYDROCHLORIDE 5 MG: 5 TABLET ORAL at 23:03

## 2024-04-24 RX ADMIN — PIPERACILLIN AND TAZOBACTAM 3.38 G: 3; .375 INJECTION, POWDER, LYOPHILIZED, FOR SOLUTION INTRAVENOUS at 17:11

## 2024-04-24 RX ADMIN — PROPOFOL 10 MG: 10 INJECTION, EMULSION INTRAVENOUS at 11:51

## 2024-04-24 RX ADMIN — ONDANSETRON 4 MG: 2 INJECTION INTRAMUSCULAR; INTRAVENOUS at 10:24

## 2024-04-24 RX ADMIN — PIPERACILLIN AND TAZOBACTAM 3.38 G: 3; .375 INJECTION, POWDER, LYOPHILIZED, FOR SOLUTION INTRAVENOUS at 22:53

## 2024-04-24 RX ADMIN — PROPOFOL 20 MG: 10 INJECTION, EMULSION INTRAVENOUS at 11:38

## 2024-04-24 RX ADMIN — MIDAZOLAM 0.5 MG: 1 INJECTION INTRAMUSCULAR; INTRAVENOUS at 10:24

## 2024-04-24 RX ADMIN — HYDROMORPHONE HYDROCHLORIDE 0.2 MG: 0.2 INJECTION, SOLUTION INTRAMUSCULAR; INTRAVENOUS; SUBCUTANEOUS at 14:49

## 2024-04-24 RX ADMIN — ONDANSETRON 4 MG: 2 INJECTION INTRAMUSCULAR; INTRAVENOUS at 12:32

## 2024-04-24 RX ADMIN — MIDAZOLAM 0.5 MG: 1 INJECTION INTRAMUSCULAR; INTRAVENOUS at 10:29

## 2024-04-24 RX ADMIN — PROPOFOL 120 MCG/KG/MIN: 10 INJECTION, EMULSION INTRAVENOUS at 10:20

## 2024-04-24 RX ADMIN — IOPAMIDOL 77 ML: 755 INJECTION, SOLUTION INTRAVENOUS at 12:49

## 2024-04-24 RX ADMIN — HYDROMORPHONE HYDROCHLORIDE 0.2 MG: 0.2 INJECTION, SOLUTION INTRAMUSCULAR; INTRAVENOUS; SUBCUTANEOUS at 17:10

## 2024-04-24 RX ADMIN — HYDROMORPHONE HYDROCHLORIDE 0.2 MG: 0.2 INJECTION, SOLUTION INTRAMUSCULAR; INTRAVENOUS; SUBCUTANEOUS at 20:57

## 2024-04-24 RX ADMIN — AMLODIPINE BESYLATE 5 MG: 5 TABLET ORAL at 20:57

## 2024-04-24 RX ADMIN — SODIUM CHLORIDE, POTASSIUM CHLORIDE, SODIUM LACTATE AND CALCIUM CHLORIDE: 600; 310; 30; 20 INJECTION, SOLUTION INTRAVENOUS at 09:55

## 2024-04-24 RX ADMIN — PROPOFOL 10 MG: 10 INJECTION, EMULSION INTRAVENOUS at 10:37

## 2024-04-24 RX ADMIN — ESMOLOL HYDROCHLORIDE 30 MG: 10 INJECTION, SOLUTION INTRAVENOUS at 11:15

## 2024-04-24 RX ADMIN — PROPOFOL 10 MG: 10 INJECTION, EMULSION INTRAVENOUS at 10:32

## 2024-04-24 ASSESSMENT — ACTIVITIES OF DAILY LIVING (ADL)
ADLS_ACUITY_SCORE: 27
ADLS_ACUITY_SCORE: 29
ADLS_ACUITY_SCORE: 27
ADLS_ACUITY_SCORE: 29
ADLS_ACUITY_SCORE: 27
ADLS_ACUITY_SCORE: 29
ADLS_ACUITY_SCORE: 27
ADLS_ACUITY_SCORE: 29
ADLS_ACUITY_SCORE: 29

## 2024-04-24 NOTE — ANESTHESIA CARE TRANSFER NOTE
Patient: Basil Conteh    Procedure: Procedure(s):  ESOPHAGOGASTRODUODENOSCOPY, WITH FINE NEEDLE ASPIRATION BIOPSY, WITH ENDOSCOPIC ULTRASOUND GUIDANCE       Diagnosis: Pancreatic mass [K86.89]  Diagnosis Additional Information: No value filed.    Anesthesia Type:   MAC     Note:    Oropharynx: oropharynx clear of all foreign objects  Level of Consciousness: awake  Oxygen Supplementation: nasal cannula  Level of Supplemental Oxygen (L/min / FiO2): 4L  Independent Airway: airway patency satisfactory and stable  Dentition: dentition unchanged  Vital Signs Stable: post-procedure vital signs reviewed and stable  Report to RN Given: handoff report given  Patient transferred to: PACU    Handoff Report: Identifed the Patient, Identified the Reponsible Provider, Reviewed the pertinent medical history, Discussed the surgical course, Reviewed Intra-OP anesthesia mangement and issues during anesthesia, Set expectations for post-procedure period and Allowed opportunity for questions and acknowledgement of understanding      Vitals:  Vitals Value Taken Time   /93 04/24/24 1215   Temp     Pulse 103 04/24/24 1220   Resp 14 04/24/24 1220   SpO2 100 % 04/24/24 1220   Vitals shown include unfiled device data.    Electronically Signed By: TAMARA Sandhu CRNA  April 24, 2024  12:20 PM

## 2024-04-24 NOTE — ANESTHESIA POSTPROCEDURE EVALUATION
Patient: Basil Conteh    Procedure: Procedure(s):  ESOPHAGOGASTRODUODENOSCOPY, WITH FINE NEEDLE ASPIRATION BIOPSY, WITH ENDOSCOPIC ULTRASOUND GUIDANCE       Anesthesia Type:  MAC    Note:  Disposition: Admission   Postop Pain Control: Uneventful            Sign Out: Well controlled pain   PONV: No   Neuro/Psych: Uneventful            Sign Out: Acceptable/Baseline neuro status   Airway/Respiratory: Uneventful            Sign Out: Acceptable/Baseline resp. status   CV/Hemodynamics: Uneventful            Sign Out: Acceptable CV status; No obvious hypovolemia; No obvious fluid overload   Other NRE: NONE   DID A NON-ROUTINE EVENT OCCUR? No       Last vitals:  Vitals Value Taken Time   /92 04/24/24 1315   Temp 36.8  C (98.3  F) 04/24/24 1215   Pulse 98 04/24/24 1323   Resp 18 04/24/24 1323   SpO2 99 % 04/24/24 1323   Vitals shown include unfiled device data.    Electronically Signed By: Phoebe Cunha MD  April 24, 2024  1:23 PM

## 2024-04-24 NOTE — ANESTHESIA PREPROCEDURE EVALUATION
Anesthesia Pre-Procedure Evaluation    Patient: Basil Conteh   MRN: [id number] : 1955        Procedure : Procedure(s):  ENDOSCOPIC ULTRASOUND, ESOPHAGOSCOPY / UPPER GASTROINTESTINAL TRACT (GI)          Past Medical History:   Diagnosis Date     Benign essential hypertension      GERD (gastroesophageal reflux disease)      Gout      IPMN (intraductal papillary mucinous neoplasm)      Prostate cancer metastatic to multiple sites (H)       Past Surgical History:   Procedure Laterality Date     HERNIA REPAIR        No Known Allergies   Social History     Tobacco Use     Smoking status: Never     Smokeless tobacco: Never   Substance Use Topics     Alcohol use: Never      Wt Readings from Last 1 Encounters:   24 56.8 kg (125 lb 3.5 oz)        Anesthesia Evaluation   Pt has had prior anesthetic.     No history of anesthetic complications       ROS/MED HX  ENT/Pulmonary:     (+)     ELEAZAR risk factors,  hypertension,                                 Neurologic:  - neg neurologic ROS     Cardiovascular:     (+)  hypertension- -   -  - -                                 Previous cardiac testing   Echo: Date: 24 Results:  Interpretation Summary  Global and regional left ventricular function is normal with an EF of 60-65%.  Right ventricular function, chamber size, wall motion, and thickness are  normal.  The inferior vena cava is normal.  No pericardial effusion is present.  No significant valvular abnormalities present.    Stress Test:  Date:  Results:  CONCLUSION:   Normal bicycle stress echocardiogram with adequate heart rate and   workload.   No evidence for inducible ischemia.   LVH is present. Question of apical hypertrophic cardiomyopathy.     ECG Reviewed:  Date: 2023 Results:  Sinus rhythm   Minimal voltage criteria for LVH, may be normal variant   ST & T wave abnormality, consider anterolateral ischemia   Abnormal ECG     Cath:  Date: Results:      METS/Exercise Tolerance: >4 METS Comment: Walks  "his dogs 30 minutes daily.  Also does yard work.  Denies any exertional dyspnea or angina.    Hematologic:  - neg hematologic  ROS     Musculoskeletal: Comment: Intermittent back pain      GI/Hepatic: Comment: Splenic vein occlusion    (+) GERD, Symptomatic,                  Renal/Genitourinary: Comment: Prostate cancer      Endo: Comment: IPMN      Psychiatric/Substance Use:  - neg psychiatric ROS     Infectious Disease:  - neg infectious disease ROS     Malignancy:   (+) Malignancy, History of Prostate.Prostate CA Active status post.      Other:  - neg other ROS          Physical Exam    Airway        Mallampati: II   TM distance: < 3 FB   Neck ROM: full   Mouth opening: > 3 cm    Respiratory Devices and Support         Dental       (+) Minor Abnormalities - some fillings, tiny chips      Cardiovascular   cardiovascular exam normal       Rhythm and rate: regular     Pulmonary   pulmonary exam normal        breath sounds clear to auscultation       OUTSIDE LABS:  CBC:   Lab Results   Component Value Date    WBC 7.1 04/11/2024    HGB 13.2 (L) 04/11/2024    HCT 39.2 (L) 04/11/2024     04/11/2024     BMP:   Lab Results   Component Value Date     04/11/2024    POTASSIUM 4.7 04/11/2024    CHLORIDE 101 04/11/2024    CO2 26 04/11/2024    BUN 28.3 (H) 04/11/2024    CR 0.99 04/11/2024     (H) 04/11/2024     COAGS: No results found for: \"PTT\", \"INR\", \"FIBR\"  POC: No results found for: \"BGM\", \"HCG\", \"HCGS\"  HEPATIC:   Lab Results   Component Value Date    ALBUMIN 4.2 04/11/2024    PROTTOTAL 7.3 04/11/2024    ALT <5 04/11/2024    AST 16 04/11/2024    ALKPHOS 100 04/11/2024    BILITOTAL 0.3 04/11/2024     OTHER:   Lab Results   Component Value Date    ZACHARY 9.4 04/11/2024       Anesthesia Plan    ASA Status:  3    NPO Status:  NPO Appropriate    Anesthesia Type: MAC.     - Reason for MAC: straight local not clinically adequate   Induction: Intravenous, Propofol.   Maintenance: TIVA.    "     Consents    Anesthesia Plan(s) and associated risks, benefits, and realistic alternatives discussed. Questions answered and patient/representative(s) expressed understanding.     - Discussed:     - Discussed with:  Patient      - Extended Intubation/Ventilatory Support Discussed: No.      - Patient is DNR/DNI Status: No     Use of blood products discussed: No .     Postoperative Care    Pain management: Oral pain medications.   PONV prophylaxis: Ondansetron (or other 5HT-3)     Comments:             Milady Phillips MD    I have reviewed the pertinent notes and labs in the chart from the past 30 days and (re)examined the patient.  Any updates or changes from those notes are reflected in this note.

## 2024-04-24 NOTE — H&P
[facility]    History and Physical - Hospitalist Service, GOLD TEAM 7       Date of Admission:  4/24/2024    Assessment & Plan      Basil Conteh is a 68 year old male w/ a hx of HTN, gout, GERD, metastatic prostate cancer and pancreatic cyst concerning for malignancy admitted on 4/24/2024 after EUS with FNA of pancreatic lesion that resulted in possible cyst rupture requiring IV abx, repeat CT and observation overnight.    Pancreatic Mass w/ concern for malignancy   EUS w/ FNA of cystic mass complicated by cyst leak  Patient was found to have a pancreatic cyst during imaging for his metastatic prostate cancer. The cyst was sampled via EUS and was found to be consistent with IPMN and surgical oncology was planning to remove this. However, on repeat imaging he had increasing soft tissue density and possible peritoneal stranding more suggestive of pancreatic malignancy. Given this surgery was canceled and he was sent to [facility] for urgent biopsy. During this biopsy after the cyst was sample patient had fluid and blood draining despite epinephrine prompting concern for cyst rupture. Panc/Bili discussed with SOC and patient will be admitted overnight for observation. Plan for Zosyn and repeat CT scan.  - repeat CT scan w/ contrast   - NPO w/ NGT to LIWS  - Continue Zosyn  - pain control with tylenol, oxy, dilaudid     Metastatic Prostate cancer  He has a recent history of metastatic prostate cancer with significant periaortic lymphadenopathy extending throughout his abdomen now on treatment diagnosed back in 2023. He was started on lupron and xtandi for this.  - continue Xtandi 160 mg daily     HTN  Stable continue amlodpine 5 mg daily    Gout  No signs of acute gout flare.  - Continue allopurinol 200 mg daily     GERD  Continue omeprazole 20 mg daily          Diet: NPO per Anesthesia Guidelines for Procedure/Surgery Except for: Meds    DVT Prophylaxis: Pneumatic Compression  Devices  Burgos Catheter: Not present  Lines: None     Cardiac Monitoring: ACTIVE order. Indication: Procedural area  Code Status:  Full code    Clinically Significant Risk Factors Present on Admission                                  Disposition Plan     Medically Ready for Discharge: Anticipated Tomorrow           Darrel Montero DO  Hospitalist Service, GOLD TEAM [address] [place] [address]  Securely message with [employer] (more info)  Text page via [facility] Paging/Directory   See signed in provider for up to date coverage information    ______________________________________________________________________    Chief Complaint   Concern for cyst rupture     History is obtained from the patient    History of Present Illness     Basil Conteh is a 68 year old male w/ a hx of HTN, gout, GERD, metastatic prostate cancer and pancreatic cyst concerning for malignancy admitted on 4/24/2024 after EUS with FNA of pancreatic lesion that resulted in possible cyst rupture requiring IV abx, repeat CT and observation overnight.    Patient was found to have a pancreatic cyst during imaging for his metastatic prostate cancer. The cyst was sampled via EUS and was found to be consistent with IPMN and surgical oncology was planning to remove this. However, on repeat imaging he had increasing soft tissue density and possible peritoneal stranding more suggestive of pancreatic malignancy. Given this surgery was canceled and he was sent to [facility] for urgent biopsy. During this biopsy after the cyst was sample patient had fluid and blood draining despite epinephrine prompting concern for cyst rupture. Panc/Bili discussed with SOC and patient will be admitted overnight for observation.     When I saw the patient in the PACU he was doing well. He denied abdominal pain but endorsed fullness. He did note pretty intense back pain. Otherwise breathing stable, no chest pain, no fevers or chills.      Past Medical History     Past Medical History:   Diagnosis Date    Benign essential hypertension     GERD (gastroesophageal reflux disease)     Gout     IPMN (intraductal papillary mucinous neoplasm)     Prostate cancer metastatic to multiple sites (H)        Past Surgical History   Past Surgical History:   Procedure Laterality Date    HERNIA REPAIR         Prior to Admission Medications   Prior to Admission Medications   Prescriptions Last Dose Informant Patient Reported? Taking?   acetaminophen (TYLENOL) 500 MG tablet 4/23/2024 at 2000  Yes Yes   Sig: Take 500-1,000 mg by mouth every morning   allopurinol (ZYLOPRIM) 100 MG tablet 4/22/2024 at 0900  Yes Yes   Sig: Take 2 tablets by mouth every morning   amLODIPine (NORVASC) 5 MG tablet 4/23/2024 at 0900  No Yes   Sig: Take 1 tablet (5 mg) by mouth daily Discontinue 2.5mg daily dose   diphenhydrAMINE (BENADRYL) 25 MG tablet Past Week  Yes Yes   Sig: Take 25 mg by mouth every morning   enzalutamide (XTANDI) 40 MG capsule 4/23/2024 at 1400  Yes Yes   Sig: Take 4 capsules by mouth daily (with lunch)   ibuprofen (ADVIL/MOTRIN) 200 MG tablet 4/22/2024 at 0800  Yes No   Sig: Take 200 mg by mouth every 4 hours as needed   omeprazole (PRILOSEC OTC) 20 MG EC tablet 4/22/2024 at 0800  Yes No   Sig: Take 20 mg by mouth daily (with breakfast)      Facility-Administered Medications: None        Review of Systems    The 10 point Review of Systems is negative other than noted in the HPI or here.     Social History   I have reviewed this patient's social history and updated it with pertinent information if needed.  Social History     Tobacco Use    Smoking status: Never    Smokeless tobacco: Never   Substance Use Topics    Alcohol use: Never    Drug use: Never         Family History     No significant family history, including no history of: pancreatic cancer       Allergies   No Known Allergies     Physical Exam   Vital Signs: Temp: 98.3  F (36.8  C) Temp src: Oral BP: (!) 136/93 Pulse: 103   Resp: 15  SpO2: 100 % O2 Device: Nasal cannula Oxygen Delivery: 4 LPM  Weight: 125 lbs 3.54 oz    General Appearance: In bed no distress   Respiratory: breathing comfortably on nasal cannula   Cardiovascular: RRR  GI: non distended, no rebound or guarding   Skin: no rashes or lesions   Other: Non focal neuro exam      Medical Decision Making       70 MINUTES SPENT BY ME on the date of service doing chart review, history, exam, documentation & further activities per the note.      Data     I have personally reviewed the following data over the past 24 hrs:    6.2  \   10.0 (L)   / 160     N/A N/A N/A /  N/A   N/A N/A N/A \       Imaging results reviewed over the past 24 hrs:   No results found for this or any previous visit (from the past 24 hour(s)).

## 2024-04-24 NOTE — BRIEF OP NOTE
[facility]    Brief Operative Note    Pre-operative diagnosis: Pancreatic mass [K86.89]  Post-operative diagnosis Same as pre-operative diagnosis    Procedure: ESOPHAGOGASTRODUODENOSCOPY, WITH FINE NEEDLE ASPIRATION BIOPSY, WITH ENDOSCOPIC ULTRASOUND GUIDANCE, N/A - Esophagus    Surgeon: Surgeons and Role:     * Brandon Hernandez MD - Primary     * Gunnar Desai MD - Fellow - Assisting  Anesthesia: MAC   Estimated Blood Loss: None    Drains: None  Specimens:   ID Type Source Tests Collected by Time Destination   1 : pancreatic tail mass Fine Needle Aspiration Pancreas FINE NEEDLE ASPIRATE Brandon Hernandez MD 4/24/2024 11:04 AM    2 : Peritoneal nodule Fine Needle Aspiration Other FINE NEEDLE ASPIRATE Brandon Hernandez MD 4/24/2024 11:30 AM      Findings:   None.  Complications: None.  Implants: * No implants in log *      Findings: Erythematous mucosa with friability present in the gastric cardia and proximal body lesser curve.    A 95 mm hypoechoic lesion with ill-defined borders involving the gastric wall was noted next to the enlarged 71 mm IPMN in the tail of the pancreas.   The lesion underwent fine needle aspiration with 4 passes made with a 25g Echotip needle.  Adequate sample obtained.  A 14mm by 6mm hypoechoic gastrohepatic peritoneal implant was seen as well and fine needle aspiration also performed with 4 passes made with a 25g Echotip needle.  Adequate sample obtained.    At the conclusion of the procedure, oozing blood was noted at the site of biopsy.  An endoscope was re-inserted and demonstrated fluid mixed with bright red blood slowly oozing from the biopsy site.  1:10,000 epinephrine was injected at the biopsy site with slowing of the oozing noted, however fluid was still noted from the opening.  The scope was withdrawn, and an NGT was placed and put to LIWS.  Zosyn 3.375g IV x1 was administered, the patient was made NPO, and a  Hgb and CTAP with IV (no PO) contrast was ordered.    Recommendations:  - admit for observation  - follow up CBC and CTAP  - NPO with NGT to LIWS

## 2024-04-24 NOTE — DISCHARGE INSTRUCTIONS
Contacting your Doctor -   To contact a doctor, call Dr Hernandez at the GI clinic at [phone]      or:  [phone] and ask for the resident on call for Gastroenterology (answered 24 hours a day)   Emergency Department:  [facility]: [phone]  [facility]: [phone] 911 if you are in need of immediate or emergent help

## 2024-04-24 NOTE — OR NURSING
Rounding completed on Patient.  Patient and family updated with time frame for OR schedule and will continue with updates as they are available.  Patient states that he is comfortable and has no needs at this time.  Family and patient agreeable to plan at this point.

## 2024-04-24 NOTE — OR NURSING
Patient declined Cantonese  at this time. Son at patient bedside and agreeable that he can translate. Questions and concerns addressed. Patient instructed should he have questions or need  services during pre-op to let the RN know.

## 2024-04-25 VITALS
RESPIRATION RATE: 18 BRPM | SYSTOLIC BLOOD PRESSURE: 137 MMHG | TEMPERATURE: 98.2 F | OXYGEN SATURATION: 100 % | HEART RATE: 97 BPM | HEIGHT: 65 IN | DIASTOLIC BLOOD PRESSURE: 92 MMHG | BODY MASS INDEX: 20.86 KG/M2 | WEIGHT: 125.22 LBS

## 2024-04-25 LAB
ALBUMIN SERPL BCG-MCNC: 4 G/DL (ref 3.5–5.2)
ALP SERPL-CCNC: 80 U/L (ref 40–150)
ALT SERPL W P-5'-P-CCNC: 5 U/L (ref 0–70)
ANION GAP SERPL CALCULATED.3IONS-SCNC: 11 MMOL/L (ref 7–15)
AST SERPL W P-5'-P-CCNC: 14 U/L (ref 0–45)
BILIRUB SERPL-MCNC: 0.7 MG/DL
BUN SERPL-MCNC: 17.4 MG/DL (ref 8–23)
CALCIUM SERPL-MCNC: 9.3 MG/DL (ref 8.8–10.2)
CHLORIDE SERPL-SCNC: 101 MMOL/L (ref 98–107)
CREAT SERPL-MCNC: 0.78 MG/DL (ref 0.67–1.17)
DEPRECATED HCO3 PLAS-SCNC: 26 MMOL/L (ref 22–29)
EGFRCR SERPLBLD CKD-EPI 2021: >90 ML/MIN/1.73M2
ERYTHROCYTE [DISTWIDTH] IN BLOOD BY AUTOMATED COUNT: 13 % (ref 10–15)
GLUCOSE SERPL-MCNC: 110 MG/DL (ref 70–99)
HCT VFR BLD AUTO: 37.3 % (ref 40–53)
HGB BLD-MCNC: 12.7 G/DL (ref 13.3–17.7)
MCH RBC QN AUTO: 29.2 PG (ref 26.5–33)
MCHC RBC AUTO-ENTMCNC: 34 G/DL (ref 31.5–36.5)
MCV RBC AUTO: 86 FL (ref 78–100)
PATH REPORT.COMMENTS IMP SPEC: ABNORMAL
PATH REPORT.COMMENTS IMP SPEC: YES
PATH REPORT.FINAL DX SPEC: ABNORMAL
PATH REPORT.GROSS SPEC: ABNORMAL
PATH REPORT.MICROSCOPIC SPEC OTHER STN: ABNORMAL
PATH REPORT.RELEVANT HX SPEC: ABNORMAL
PLATELET # BLD AUTO: 179 10E3/UL (ref 150–450)
POTASSIUM SERPL-SCNC: 3.5 MMOL/L (ref 3.4–5.3)
PROT SERPL-MCNC: 6.4 G/DL (ref 6.4–8.3)
RBC # BLD AUTO: 4.35 10E6/UL (ref 4.4–5.9)
SODIUM SERPL-SCNC: 138 MMOL/L (ref 135–145)
WBC # BLD AUTO: 9.3 10E3/UL (ref 4–11)

## 2024-04-25 PROCEDURE — 250N000013 HC RX MED GY IP 250 OP 250 PS 637: Performed by: INTERNAL MEDICINE

## 2024-04-25 PROCEDURE — 250N000013 HC RX MED GY IP 250 OP 250 PS 637: Performed by: STUDENT IN AN ORGANIZED HEALTH CARE EDUCATION/TRAINING PROGRAM

## 2024-04-25 PROCEDURE — 96376 TX/PRO/DX INJ SAME DRUG ADON: CPT

## 2024-04-25 PROCEDURE — 250N000011 HC RX IP 250 OP 636: Performed by: STUDENT IN AN ORGANIZED HEALTH CARE EDUCATION/TRAINING PROGRAM

## 2024-04-25 PROCEDURE — 258N000003 HC RX IP 258 OP 636: Performed by: INTERNAL MEDICINE

## 2024-04-25 PROCEDURE — 99239 HOSP IP/OBS DSCHRG MGMT >30: CPT | Performed by: STUDENT IN AN ORGANIZED HEALTH CARE EDUCATION/TRAINING PROGRAM

## 2024-04-25 PROCEDURE — 85027 COMPLETE CBC AUTOMATED: CPT | Performed by: STUDENT IN AN ORGANIZED HEALTH CARE EDUCATION/TRAINING PROGRAM

## 2024-04-25 PROCEDURE — G0378 HOSPITAL OBSERVATION PER HR: HCPCS

## 2024-04-25 PROCEDURE — 36415 COLL VENOUS BLD VENIPUNCTURE: CPT | Performed by: STUDENT IN AN ORGANIZED HEALTH CARE EDUCATION/TRAINING PROGRAM

## 2024-04-25 PROCEDURE — 250N000011 HC RX IP 250 OP 636: Performed by: INTERNAL MEDICINE

## 2024-04-25 PROCEDURE — 84155 ASSAY OF PROTEIN SERUM: CPT | Performed by: STUDENT IN AN ORGANIZED HEALTH CARE EDUCATION/TRAINING PROGRAM

## 2024-04-25 RX ORDER — OXYCODONE HYDROCHLORIDE 5 MG/1
5 TABLET ORAL EVERY 6 HOURS PRN
Qty: 12 TABLET | Refills: 0 | Status: SHIPPED | OUTPATIENT
Start: 2024-04-25 | End: 2024-04-25

## 2024-04-25 RX ORDER — OXYCODONE HYDROCHLORIDE 5 MG/1
5 TABLET ORAL EVERY 4 HOURS PRN
Qty: 18 TABLET | Refills: 0 | Status: SHIPPED | OUTPATIENT
Start: 2024-04-25

## 2024-04-25 RX ADMIN — ALLOPURINOL 200 MG: 100 TABLET ORAL at 09:26

## 2024-04-25 RX ADMIN — OXYCODONE HYDROCHLORIDE 5 MG: 5 TABLET ORAL at 05:46

## 2024-04-25 RX ADMIN — DIPHENHYDRAMINE HYDROCHLORIDE 25 MG: 25 CAPSULE ORAL at 09:26

## 2024-04-25 RX ADMIN — PANTOPRAZOLE SODIUM 40 MG: 40 TABLET, DELAYED RELEASE ORAL at 09:26

## 2024-04-25 RX ADMIN — AMLODIPINE BESYLATE 5 MG: 5 TABLET ORAL at 09:26

## 2024-04-25 RX ADMIN — SODIUM CHLORIDE, POTASSIUM CHLORIDE, SODIUM LACTATE AND CALCIUM CHLORIDE: 600; 310; 30; 20 INJECTION, SOLUTION INTRAVENOUS at 09:31

## 2024-04-25 RX ADMIN — HYDROMORPHONE HYDROCHLORIDE 0.2 MG: 0.2 INJECTION, SOLUTION INTRAMUSCULAR; INTRAVENOUS; SUBCUTANEOUS at 01:59

## 2024-04-25 RX ADMIN — PIPERACILLIN AND TAZOBACTAM 3.38 G: 3; .375 INJECTION, POWDER, LYOPHILIZED, FOR SOLUTION INTRAVENOUS at 05:46

## 2024-04-25 RX ADMIN — ACETAMINOPHEN 500 MG: 500 TABLET ORAL at 09:26

## 2024-04-25 ASSESSMENT — ACTIVITIES OF DAILY LIVING (ADL)
ADLS_ACUITY_SCORE: 28
ADLS_ACUITY_SCORE: 27
ADLS_ACUITY_SCORE: 28

## 2024-04-25 NOTE — DISCHARGE SUMMARY
[facility]  Hospitalist Discharge Summary      Date of Admission:  4/24/2024  Date of Discharge:  4/25/2024  Discharging Provider: Darrel Montero DO  Discharge Service: Hospitalist Service, GOLD TEAM 7    Discharge Diagnoses     Pancreatic Mass w/ concern for malignancy   EUS w/ FNA of cystic mass complicated by cyst leak  Metastatic Prostate cancer  HTN  Gout  GERD    Clinically Significant Risk Factors          Follow-ups Needed After Discharge   Follow-up Appointments     Follow Up and recommended labs and tests      Please keep your previously scheduled appointments            Unresulted Labs Ordered in the Past 30 Days of this Admission       Date and Time Order Name Status Description    4/24/2024 11:34 AM Fine Needle Aspirate Pancreas In process         These results will be followed up by GI    Discharge Disposition   Discharged to home  Condition at discharge: Stable    Hospital Course     Basil Conteh is a 68 year old male w/ a hx of HTN, gout, GERD, metastatic prostate cancer and pancreatic cyst concerning for malignancy admitted on 4/24/2024 after EUS with FNA of pancreatic lesion that resulted in possible cyst rupture and bleeding. Bleeding was treated with epinephrine and repeat CT scan of abdomen showed stable cyst. Hemoglobin remained stable and patient was ok to be discharged next day.     Pancreatic Mass w/ concern for malignancy   EUS w/ FNA of cystic mass complicated by cyst leak  Patient was found to have a pancreatic cyst during imaging for his metastatic prostate cancer. The cyst was sampled via EUS and was found to be consistent with IPMN and surgical oncology was planning to remove this. However, on repeat imaging he had increasing soft tissue density and possible peritoneal stranding more suggestive of pancreatic malignancy. Given this surgery was canceled and he was sent to [facility] for urgent biopsy. During this biopsy after the cyst was sample  patient had fluid and blood draining despite epinephrine prompting concern for cyst rupture. Repeat CT scan showed stable cyst and hemoglobin was stable so he was felt ok to discharge.  - Keep previously scheduled appointments     Metastatic Prostate cancer  He has a recent history of metastatic prostate cancer with significant periaortic lymphadenopathy extending throughout his abdomen now on treatment diagnosed back in 2023. He was started on lupron and xtandi for this.  - continue Xtandi 160 mg daily      HTN  Stable continue amlodpine 5 mg daily     Gout  No signs of acute gout flare.  - Continue allopurinol 200 mg daily      GERD  Continue omeprazole 20 mg daily    Consultations This Hospital Stay   None    Code Status   Full Code    Time Spent on this Encounter   IDarrel DO, personally saw the patient today and spent greater than 30 minutes discharging this patient.       Darrel Montero DO  [facility] [address] [address]  Phone: [phone]  ______________________________________________________________________    Physical Exam   Vital Signs: Temp: 98.1  F (36.7  C) Temp src: Oral BP: (!) 147/95 Pulse: 90   Resp: 18 SpO2: 97 % O2 Device: None (Room air) Oxygen Delivery: 2 LPM  Weight: 125 lbs 3.54 oz    General Appearance:  In bed no distress   Respiratory: breathing comfortably on room air   Cardiovascular: RRR  GI: non distended, no rebound or guarding   Skin: no rashes or lesions   Other:  Non focal neuro exam         Primary Care Physician   Physician No Ref-Primary    Discharge Orders      Reason for your hospital stay    You were in the hospital for a scheduled biopsy of a pancreatic cyst. There was some bleeding after your procedure so you were monitored overnight. Your hemoglobin remained stable and you were felt ok to discharge.     Activity    Your activity upon discharge: activity as tolerated     Follow Up and recommended labs and tests    Please  keep your previously scheduled appointments     Diet    Follow this diet upon discharge: Regular diet       Significant Results and Procedures   Most Recent 3 CBC's:  Recent Labs   Lab Test 04/25/24  0659 04/24/24  2136 04/24/24  1233 04/11/24  1418   WBC 9.3  --  6.2 7.1   HGB 12.7* 12.3* 10.0* 13.2*   MCV 86  --  87 86     --  160 219     Most Recent 3 BMP's:  Recent Labs   Lab Test 04/25/24  0659 04/11/24  1418    138   POTASSIUM 3.5 4.7   CHLORIDE 101 101   CO2 26 26   BUN 17.4 28.3*   CR 0.78 0.99   ANIONGAP 11 11   ZACHARY 9.3 9.4   * 106*     Most Recent 2 LFT's:  Recent Labs   Lab Test 04/25/24  0659 04/11/24  1418   AST 14 16   ALT 5 <5   ALKPHOS 80 100   BILITOTAL 0.7 0.3     Most Recent 3 INR's:No lab results found.,   Results for orders placed or performed during the hospital encounter of 04/24/24   CT Abdomen Pelvis w Contrast    Narrative    Exam: Abdomen/pelvis CT with contrast 4/24/2024 1:13 PM      History: **IV contrast only, no oral contrast** s/p EUS-guided biopsy  of a perigastric mass with possible perforation - please evaluate    Comparison: Abdomen/pelvis CT with contrast 4/11/2024. Outside  institution studies: abdomen MRI with contrast 8/15/2023, nuclear  medicine bone scan 7/24/2023, chest/abdomen/pelvis CT with contrast  7/24/2023, abdomen radiograph 6/27/2023, abdomen/pelvis CT with  contrast 10/25/2017.    Technique: Helical CT from the lung bases through the symphysis pubis  was performed with intravenous contrast.    Findings:    Liver: No suspicious liver mass. Innumerable scattered simple hepatic  cysts.    Gallbladder: No calcified gallstones or biliary ductal dilatation.    Pancreas: Mass centered in the body and tail of the pancreas is  infiltrative and hypoenhancing measuring 7.8 x 5.6 x 8.9 cm  (previously 7.8 x 5.6 cm on CT 4/11/2024 and measuring in similar  fashion) and also contains an exophytic cystic fluid-filled component  measuring 7.2 x 8.8 x 8.0 cm  (previously 7.2 x 8.8 cm). The cystic  component demonstrates solid enhancing mural nodularity similar to  prior. This causes mass effect on the adjacent greater curvature of  the stomach. This also obliterates the splenic vein and encases and  narrows the splenic artery. Infiltrative hypoenhancing stranding soft  tissue density continues inferiorly and encases the abdominal aorta to  the level of the iliac bifurcation.    Spleen: Normal size. Considering occlusion of the splenic artery and  vein, there is perfusion by numerous collateral vessels.    Adrenals: No right adrenal gland nodule. Left adrenal gland obscured  by the pancreatic mass and confluent adenopathy.     Kidneys/bladder: Symmetric renal parenchymal enhancement. Multiple  renal cysts. No hydronephrosis, calculus, or renal mass. Urinary  bladder is normal.    Reproductive: No suspicious pelvic mass. Left-sided varicocele.    Bowel: Enteric tube is coiled in stomach. Normal caliber small and  large bowel. Normal appendix. Colonic diverticulosis.    Mesentery/peritoneum: No free fluid. Trace foci of extraluminal air in  the right upper quadrant (series 4 images 101 and 105). This was not  present on CT 4/11/2024. Perihepatic soft tissue thickening (series 4  image 31) measures 5.5 x 1.8 cm. Enhancing omental gastrohepatic soft  tissue nodule measures 1.7 x 1.5 cm (series 4 image 99). Peripheral  enhancing centrally hypodense thick-walled nodule in the rectovesical  space measures 2.2 x 2.5 cm (series 4 image 263). These nodules were  previously present on 4/11/2024.    Lymph nodes: Confluent retroperitoneal periaortic soft tissues overall  similar to prior.    Vascular: Portal vein and superior mesenteric vein are patent. Splenic  vein is occluded and the splenic artery is encased by the infiltrative  presumably pancreatic mass. Celiac artery, superior mesenteric artery,  and inferior mesenteric arteries are patent. Common iliac, external  iliac,  internal iliac, and proximal femoral arteries are patent  bilaterally. Single right and 2 left renal arteries are patent. Left  renal vein is diminutive due to the infiltrative aforementioned mass,  however patent. Mild scattered aortoiliac calcifications.  Nonaneurysmal infrarenal aorta.     Bones/soft tissues: No acute fracture or dislocation. Lower lumbar  degenerative disc disease. Mild bilateral hip osteoarthrosis. Chronic  appearing bilateral pars interarticularis defects at L5-S1.    Lung bases: Patchy opacities representing atelectasis, new in the left  lower lobe and lingula with associated volume loss. Coronary artery  calcification.      Impression    Impression:   1. Trace pneumoperitoneum in the right upper quadrant which may be  postprocedural. No large volume free air.    2. Overall stable size of the malignant appearing cystic and solid  pancreatic body/tail lesion compared to 4/11/2024. The associated  confluent retroperitoneal adenopathy, splenic vein occlusion, and  peritoneal carcinomatosis is also unchanged.    3. Colonic diverticulosis.    4. New left lower lobe and lingular atelectasis with volume loss.    I have personally reviewed the examination and initial interpretation  and I agree with the findings.    TAN CHANDLER,          SYSTEM ID:  [id number]       Discharge Medications   Current Discharge Medication List        CONTINUE these medications which have NOT CHANGED    Details   acetaminophen (TYLENOL) 500 MG tablet Take 500-1,000 mg by mouth every morning      allopurinol (ZYLOPRIM) 100 MG tablet Take 2 tablets by mouth every morning      amLODIPine (NORVASC) 5 MG tablet Take 1 tablet (5 mg) by mouth daily Discontinue 2.5mg daily dose  Qty: 30 tablet, Refills: 1    Associated Diagnoses: Preop examination; Hypertension, unspecified type      diphenhydrAMINE (BENADRYL) 25 MG tablet Take 25 mg by mouth every morning      enzalutamide (XTANDI) 40 MG capsule Take 4 capsules by mouth daily  (with lunch)      ibuprofen (ADVIL/MOTRIN) 200 MG tablet Take 200 mg by mouth every 4 hours as needed      omeprazole (PRILOSEC OTC) 20 MG EC tablet Take 20 mg by mouth daily (with breakfast)           Allergies   No Known Allergies

## 2024-04-25 NOTE — PLAN OF CARE
Goal Outcome Evaluation:      Plan of Care Reviewed With: patient    Overall Patient Progress: improvingOverall Patient Progress: improving         Temp: 98.1  F (36.7  C) Temp src: Oral BP: (!) 145/106 Pulse: 93   Resp: 18 SpO2: 97 % O2 Device: None (Room air) Oxygen Delivery: 2 LPM       Reason for admission: Pancreatic cyst biopsy    NEURO: AOx3  RESPIRATORY: WNL, capno and continuous pulse ox monitoring   CARDIAC: elevated BP >140's/100's  GI/: WNL, voiding independently, no BM  DIET: Regular  PAIN/NAUSEA: pain rated at 3/10 through the night, managed with oxycodone PO x2 and dilaudid IV x2  INCISION/DRAINS/SKIN: NG tube connected to LIS,no output noted. landmark visible @ 80  IV ACCESS: L. PIV infusing LR @ 100ml/h  ACTIVITY: SBA  LAB: n/a  CHANGES: no changes overnight  PLAN: continue plan of care

## 2024-04-25 NOTE — PLAN OF CARE
Goal Outcome Evaluation:    Temp: 98.1  F (36.7  C) Temp src: Oral BP: (!) 147/97 Pulse: 90   Resp: 18 SpO2: 97 % O2 Device: None (Room air)        OBS Note      No further diagnostic labs or tests to result  Vital signs are at patient baseline. Hypertension managed with medication patient already has prescribed  No need for IV medication or pain management  NG tube removed at beginning of shift without issue; Cannister had collected no output since its insertion; Patient then tolerated a meal on clear liquid; His diet will be advanced to regular for lunch

## 2024-04-25 NOTE — PROGRESS NOTES
[facility]  GASTROENTEROLOGY PROGRESS NOTE  Basil Conteh [id number]     IMPRESSION:  Basil Conteh is a 68 year old male with a history of metastatic prostate cancer with significant periaortic lymphadenopathy s/p palliative Lupron therapy with significant reduction in lymphadenopathy, found to have a symptomatic 6 cm pancreatic tail IPMN s/p diagnostic EUS 9/25/23.  An open distal pancreatectomy and splenectomy was planned and a pre-op CTAP was performed 4/11/24 demonstrating interval enlargement of the cyst to 7.9cm and significant associated wall thickening with mild enhancement concerning for malignancy.      He underwent EUS with fine needle aspiration of the thickened cyst wall which was noted to extend into the gastric wall, as well as a 14mm gastrohepatic peritoneal implant.  Following the procedure, persistent oozing was noted in the gastric lesser curve at the biopsy sites with possible liquid extruding from the opening.  Given concern for possible cyst puncture and decompression into the stomach, perforation, or ongoing bleeding, a CTAP was ordered, zosyn was administered, an NGT was placed to LIWS, and the Hgb was trended.      There was no evidence of perforation, cyst decompression, and the Hgb has remained stable.  The patient denies overt GI tract bleeding.    RECOMMENDATIONS:  - ok to discontinue NGT and start clear liquid diet  - can stop trending Hgb unless he develops signs of overt GI tract bleeding  - ok to discharge if patient is tolerating diet, no abdominal pain, and no overt GI tract bleeding  - appreciate primary team care of this patient    Patient was discussed with attending physician, Dr. Hernandez.      Gunnar Desai MD  Advanced Gastroenterology Fellow  4/25/2024      SUBJECTIVE:  The patient states he feels well today.  No change in previous abdominal pain, denies bowel movements.  No hematemesis.      OBJECTIVE:  VS: BP (!) 147/95 (BP Location: Left arm)   Pulse 90   Temp  "98.1  F (36.7  C) (Oral)   Resp 18   Ht 1.651 m (5' 5\")   Wt 56.8 kg (125 lb 3.5 oz)   SpO2 97%   BMI 20.84 kg/m     GEN: A&Ox3, NAD, comfortable  HEENT: NGT to right nostril  CV:  RRR, no M/G/R  PULM:  CTAB  ABD: normal BS, soft, NT/ND - still has baseline pain, no change from pre-op exam 4/24    REVIEW OF LABORATORY, PATHOLOGY AND IMAGING RESULTS:  BMP  Recent Labs   Lab 04/25/24  0659      POTASSIUM 3.5   CHLORIDE 101   ZACHARY 9.3   CO2 26   BUN 17.4   CR 0.78   *     CBC  Recent Labs   Lab 04/25/24  0659 04/24/24  2136 04/24/24  1233   WBC 9.3  --  6.2   RBC 4.35*  --  3.38*   HGB 12.7*   < > 10.0*   HCT 37.3*  --  29.5*   MCV 86  --  87   MCH 29.2  --  29.6   MCHC 34.0  --  33.9   RDW 13.0  --  13.1     --  160    < > = values in this interval not displayed.     INRNo lab results found in last 7 days.  LFTs  Recent Labs   Lab 04/25/24  0659   ALKPHOS 80   AST 14   ALT 5   BILITOTAL 0.7   PROTTOTAL 6.4   ALBUMIN 4.0      PANCNo lab results found in last 7 days.           "

## 2024-04-25 NOTE — DISCHARGE SUMMARY
Temp: 98.2  F (36.8  C) Temp src: Oral BP: (!) 137/92 Pulse: 97   Resp: 18 SpO2: 100 % O2 Device: None (Room air)     Patient discharged home following hospital stay for: pancreatic cyst     Vital signs stable: hypertension managed by continued home medication  Oxygen status: RA  Patient tolerating diet: yes - going home on a regular diet     Belongings sent with patient. IV site discontinued. Discharge meds to discharge pharmacy. AVS and education gone over with patient.  Pt to follow up as outpatient and with PCP. Pt verbalized understanding of all education and information.

## 2024-04-26 ENCOUNTER — TELEPHONE (OUTPATIENT)
Dept: GASTROENTEROLOGY | Facility: CLINIC | Age: 69
End: 2024-04-26
Payer: COMMERCIAL

## 2024-04-26 LAB — UPPER EUS: NORMAL

## 2024-04-26 NOTE — CONFIDENTIAL NOTE
Viky -     See below.  Please message the office for Dr. Behbahani that we have documented a diagnosis of metastatic pancreatic adenocarcinoma via pancreatic and peritoneal biopsies and that the pt requested care via [facility] for this, either via Dr. Behbahani or a colleague.    I cannot sent and outside Epic message to this provider.      DEON Hernandez MD  Professor of Medicine  Division of Gastroenterology, Hepatology and Nutrition  [facility]        Final biopsy results from recent EUS returned.    Both the pancreatic mass and the peritoneal nodule returned showing well-differentiated adenocarcinoma with mucinous features.     This is suggestive of a pancreatic primary lesion and not the previously known prostate cancer.    I called and updated the pt (with [employer] ).  He would prefer further oncologic care through [facility]. I discussed that this will likely require a different oncologist however this could be determined by Dr. Behbahani.

## 2024-04-27 ENCOUNTER — PATIENT OUTREACH (OUTPATIENT)
Dept: CARE COORDINATION | Facility: CLINIC | Age: 69
End: 2024-04-27
Payer: COMMERCIAL

## 2024-04-27 NOTE — PROGRESS NOTES
Connected Care Resource Center:   [facility] Contact  [facility]/Voicemail     Clinical Data: Post-Discharge Outreach     Outreach attempted x 2.  No answer.     Plan:  [facility] will do no further outreaches at this time.       Kamille De Santiago MA  [facility] [place], [facility]    *Connected Care Resource Team does NOT follow patient ongoing. Referrals are identified based on internal discharge reports and the outreach is to ensure patient has an understanding of their discharge instructions.

## 2024-04-29 ENCOUNTER — DOCUMENTATION ONLY (OUTPATIENT)
Dept: GASTROENTEROLOGY | Facility: CLINIC | Age: 69
End: 2024-04-29
Payer: COMMERCIAL

## 2024-04-29 NOTE — PROGRESS NOTES
Left message on Dr. Behbahani's nurses line to confirm receipt of records. Requested call back     Osiris Haque

## 2024-04-29 NOTE — PROGRESS NOTES
Nurse from Dr. Behbahani's office called back to confirm receiving all records that were sent over on 4/29 at 1:20pm.     Osiris Haque

## 2024-05-02 ENCOUNTER — TELEPHONE (OUTPATIENT)
Dept: ONCOLOGY | Facility: CLINIC | Age: 69
End: 2024-05-02
Payer: COMMERCIAL

## 2024-05-02 NOTE — TELEPHONE ENCOUNTER
Pt's wife, Tonya, is calling. Consent to communicate is on file.  Pt is currently scheduled for vaccines on 5/15/2024 that were ordered by Dr Rios.   She is wondering if this appt can be cancelled since pt did not end up having surgery?    Will route to care team for review and recommendations.    OK to leave message if unable to reach her.     Kanchan Randhawa RN on 5/2/2024 at 3:37 PM

## 2024-05-03 NOTE — TELEPHONE ENCOUNTER
Carolyn Thorpe, RN  You16 hours ago (3:44 PM)     LK  It wouldn't hurt to get them. Dr. Rios said his spleen is blocked off anyway so it wouldn't hurt.  It's up to the patient    Thanks,  Carolyn     Attempted to reach pt's wife, but she did not answer.  Per request, left detailed message with recommendations.  Please contact the clinic with any questions.    Kanchan Randhawa RN on 5/3/2024 at 8:20 AM

## (undated) DEVICE — ENDO BITE BLOCK ADULT OMNI-BLOC

## (undated) DEVICE — NDL SCLEROTHERAPY 25GA CARR-LOCK  00711811

## (undated) DEVICE — ENDO TUBING CO2 SMARTCAP STERILE DISP 100145CO2EXT

## (undated) DEVICE — SOL WATER IRRIG 1000ML BOTTLE 2F7114

## (undated) DEVICE — ENDO CAP AND TUBING STERILE FOR ENDOGATOR  100130

## (undated) DEVICE — ENDO PROBE COVER ULTRASOUND BALLOON LATEX  MAJ-249

## (undated) DEVICE — ENDO FORCEP ENDOJAW BIOPSY 2.8MMX230CM FB-220U

## (undated) DEVICE — ENDO NDL BALL TIP ULTRASOUND 25GA ECHO-25

## (undated) DEVICE — PACK ENDOSCOPY GI CUSTOM UMMC

## (undated) DEVICE — SUCTION MANIFOLD NEPTUNE 2 SYS 4 PORT 0702-020-000

## (undated) DEVICE — KIT ENDO FIRST STEP DISINFECTANT 200ML W/POUCH EP-4

## (undated) RX ORDER — PROPOFOL 10 MG/ML
INJECTION, EMULSION INTRAVENOUS
Status: DISPENSED
Start: 2024-04-24

## (undated) RX ORDER — PIPERACILLIN SODIUM, TAZOBACTAM SODIUM 3; .375 G/15ML; G/15ML
INJECTION, POWDER, LYOPHILIZED, FOR SOLUTION INTRAVENOUS
Status: DISPENSED
Start: 2024-04-24

## (undated) RX ORDER — HYDROMORPHONE HCL IN WATER/PF 6 MG/30 ML
PATIENT CONTROLLED ANALGESIA SYRINGE INTRAVENOUS
Status: DISPENSED
Start: 2024-04-24

## (undated) RX ORDER — ESMOLOL HYDROCHLORIDE 10 MG/ML
INJECTION INTRAVENOUS
Status: DISPENSED
Start: 2024-04-24

## (undated) RX ORDER — ONDANSETRON 2 MG/ML
INJECTION INTRAMUSCULAR; INTRAVENOUS
Status: DISPENSED
Start: 2024-04-24

## (undated) RX ORDER — SODIUM CHLORIDE, SODIUM LACTATE, POTASSIUM CHLORIDE, CALCIUM CHLORIDE 600; 310; 30; 20 MG/100ML; MG/100ML; MG/100ML; MG/100ML
INJECTION, SOLUTION INTRAVENOUS
Status: DISPENSED
Start: 2024-04-24